# Patient Record
Sex: FEMALE | Race: WHITE | Employment: UNEMPLOYED | URBAN - METROPOLITAN AREA
[De-identification: names, ages, dates, MRNs, and addresses within clinical notes are randomized per-mention and may not be internally consistent; named-entity substitution may affect disease eponyms.]

---

## 2020-05-19 ENCOUNTER — HOSPITAL ENCOUNTER (EMERGENCY)
Age: 35
Discharge: HOME | End: 2020-05-19
Attending: EMERGENCY MEDICINE
Payer: COMMERCIAL

## 2020-05-19 VITALS
TEMPERATURE: 97.9 F | RESPIRATION RATE: 18 BRPM | WEIGHT: 269 LBS | BODY MASS INDEX: 45.93 KG/M2 | HEIGHT: 64 IN | DIASTOLIC BLOOD PRESSURE: 62 MMHG | SYSTOLIC BLOOD PRESSURE: 127 MMHG | OXYGEN SATURATION: 98 % | HEART RATE: 72 BPM

## 2020-05-19 LAB
BACTERIA: ABNORMAL /HPF
BILIRUBIN URINE: NEGATIVE
BLOOD, URINE: ABNORMAL
CLARITY: ABNORMAL
COLOR: YELLOW
EPITHELIAL CELLS, UA: ABNORMAL /HPF
GLUCOSE URINE: NEGATIVE MG/DL
HCG(URINE) PREGNANCY TEST: NEGATIVE
KETONES, URINE: NEGATIVE MG/DL
LEUKOCYTE ESTERASE, URINE: ABNORMAL
NITRITE, URINE: NEGATIVE
PH UA: 5.5 (ref 5–9)
PROTEIN UA: 30 MG/DL
RBC UA: ABNORMAL /HPF (ref 0–2)
SPECIFIC GRAVITY UA: >=1.03 (ref 1–1.03)
URINE REFLEX TO CULTURE: YES
UROBILINOGEN, URINE: 0.2 E.U./DL
WBC UA: ABNORMAL /HPF (ref 0–5)

## 2020-05-19 PROCEDURE — 87086 URINE CULTURE/COLONY COUNT: CPT

## 2020-05-19 PROCEDURE — 6370000000 HC RX 637 (ALT 250 FOR IP): Performed by: EMERGENCY MEDICINE

## 2020-05-19 PROCEDURE — 96372 THER/PROPH/DIAG INJ SC/IM: CPT

## 2020-05-19 PROCEDURE — 81001 URINALYSIS AUTO W/SCOPE: CPT

## 2020-05-19 PROCEDURE — 87077 CULTURE AEROBIC IDENTIFY: CPT

## 2020-05-19 PROCEDURE — 84703 CHORIONIC GONADOTROPIN ASSAY: CPT

## 2020-05-19 PROCEDURE — 6360000002 HC RX W HCPCS: Performed by: EMERGENCY MEDICINE

## 2020-05-19 PROCEDURE — 99284 EMERGENCY DEPT VISIT MOD MDM: CPT

## 2020-05-19 PROCEDURE — 87186 SC STD MICRODIL/AGAR DIL: CPT

## 2020-05-19 RX ORDER — NAPROXEN 500 MG/1
500 TABLET ORAL 2 TIMES DAILY WITH MEALS
Status: ON HOLD | COMMUNITY
Start: 2020-01-31 | End: 2021-02-05 | Stop reason: HOSPADM

## 2020-05-19 RX ORDER — ONDANSETRON 4 MG/1
4 TABLET, ORALLY DISINTEGRATING ORAL ONCE
Status: COMPLETED | OUTPATIENT
Start: 2020-05-19 | End: 2020-05-19

## 2020-05-19 RX ORDER — PROMETHAZINE HYDROCHLORIDE 25 MG/1
25 SUPPOSITORY RECTAL EVERY 6 HOURS PRN
Qty: 20 SUPPOSITORY | Refills: 0 | Status: SHIPPED | OUTPATIENT
Start: 2020-05-19 | End: 2020-05-26

## 2020-05-19 RX ORDER — PHENAZOPYRIDINE HYDROCHLORIDE 100 MG/1
200 TABLET, FILM COATED ORAL ONCE
Status: COMPLETED | OUTPATIENT
Start: 2020-05-19 | End: 2020-05-19

## 2020-05-19 RX ORDER — TRETINOIN 0.5 MG/G
CREAM TOPICAL
Status: ON HOLD | COMMUNITY
Start: 2017-05-18 | End: 2021-02-05 | Stop reason: HOSPADM

## 2020-05-19 RX ORDER — TOPIRAMATE 25 MG/1
25 TABLET ORAL NIGHTLY
Status: ON HOLD | COMMUNITY
Start: 2019-10-28 | End: 2021-02-05 | Stop reason: HOSPADM

## 2020-05-19 RX ORDER — AMMONIUM LACTATE 12 G/100G
CREAM TOPICAL
Status: ON HOLD | COMMUNITY
Start: 2017-05-18 | End: 2021-02-05 | Stop reason: HOSPADM

## 2020-05-19 RX ORDER — SIMVASTATIN 20 MG
20 TABLET ORAL NIGHTLY
Status: ON HOLD | COMMUNITY
Start: 2019-06-05 | End: 2021-02-05 | Stop reason: HOSPADM

## 2020-05-19 RX ORDER — CIPROFLOXACIN 500 MG/1
500 TABLET, FILM COATED ORAL 2 TIMES DAILY
Qty: 14 TABLET | Refills: 0 | Status: SHIPPED | OUTPATIENT
Start: 2020-05-19 | End: 2020-05-26

## 2020-05-19 RX ORDER — HYDROCODONE BITARTRATE AND ACETAMINOPHEN 5; 325 MG/1; MG/1
1 TABLET ORAL EVERY 6 HOURS PRN
Qty: 10 TABLET | Refills: 0 | Status: SHIPPED | OUTPATIENT
Start: 2020-05-19 | End: 2020-05-22

## 2020-05-19 RX ORDER — ONDANSETRON 4 MG/1
4 TABLET, FILM COATED ORAL EVERY 8 HOURS PRN
Qty: 20 TABLET | Refills: 0 | Status: ON HOLD | OUTPATIENT
Start: 2020-05-19 | End: 2021-02-05 | Stop reason: HOSPADM

## 2020-05-19 RX ORDER — METAXALONE 800 MG/1
400 TABLET ORAL 3 TIMES DAILY
Status: ON HOLD | COMMUNITY
Start: 2019-08-17 | End: 2021-02-05 | Stop reason: HOSPADM

## 2020-05-19 RX ORDER — CEFTRIAXONE 1 G/1
1 INJECTION, POWDER, FOR SOLUTION INTRAMUSCULAR; INTRAVENOUS ONCE
Status: COMPLETED | OUTPATIENT
Start: 2020-05-19 | End: 2020-05-19

## 2020-05-19 RX ORDER — SUMATRIPTAN 25 MG/1
25 TABLET, FILM COATED ORAL PRN
COMMUNITY
Start: 2019-06-03 | End: 2022-10-11

## 2020-05-19 RX ORDER — LEVOTHYROXINE SODIUM 0.2 MG/1
TABLET ORAL
COMMUNITY
Start: 2019-02-22

## 2020-05-19 RX ORDER — OXYCODONE HYDROCHLORIDE AND ACETAMINOPHEN 5; 325 MG/1; MG/1
1 TABLET ORAL ONCE
Status: COMPLETED | OUTPATIENT
Start: 2020-05-19 | End: 2020-05-19

## 2020-05-19 RX ORDER — PHENAZOPYRIDINE HYDROCHLORIDE 200 MG/1
200 TABLET, FILM COATED ORAL 3 TIMES DAILY PRN
Qty: 6 TABLET | Refills: 0 | Status: SHIPPED | OUTPATIENT
Start: 2020-05-19 | End: 2020-05-22

## 2020-05-19 RX ADMIN — ONDANSETRON 4 MG: 4 TABLET, ORALLY DISINTEGRATING ORAL at 20:46

## 2020-05-19 RX ADMIN — PHENAZOPYRIDINE HYDROCHLORIDE 200 MG: 100 TABLET ORAL at 20:46

## 2020-05-19 RX ADMIN — OXYCODONE HYDROCHLORIDE AND ACETAMINOPHEN 1 TABLET: 5; 325 TABLET ORAL at 20:46

## 2020-05-19 RX ADMIN — CEFTRIAXONE 1 G: 1 INJECTION, POWDER, FOR SOLUTION INTRAMUSCULAR; INTRAVENOUS at 20:46

## 2020-05-19 ASSESSMENT — PAIN SCALES - GENERAL
PAINLEVEL_OUTOF10: 9
PAINLEVEL_OUTOF10: 10
PAINLEVEL_OUTOF10: 10

## 2020-05-19 ASSESSMENT — PAIN DESCRIPTION - FREQUENCY: FREQUENCY: CONTINUOUS

## 2020-05-19 ASSESSMENT — PAIN DESCRIPTION - PROGRESSION: CLINICAL_PROGRESSION: GRADUALLY WORSENING

## 2020-05-19 ASSESSMENT — PAIN DESCRIPTION - ONSET: ONSET: ON-GOING

## 2020-05-19 ASSESSMENT — PAIN DESCRIPTION - DESCRIPTORS: DESCRIPTORS: PRESSURE;THROBBING

## 2020-05-19 ASSESSMENT — PAIN DESCRIPTION - LOCATION: LOCATION: GROIN;ABDOMEN;BACK

## 2020-05-19 ASSESSMENT — PAIN DESCRIPTION - PAIN TYPE: TYPE: ACUTE PAIN

## 2020-05-19 ASSESSMENT — PAIN DESCRIPTION - ORIENTATION: ORIENTATION: MID;LOWER

## 2020-05-22 LAB
ORGANISM: ABNORMAL
URINE CULTURE, ROUTINE: ABNORMAL

## 2020-06-26 ENCOUNTER — HOSPITAL ENCOUNTER (EMERGENCY)
Age: 35
Discharge: HOME | End: 2020-06-26
Attending: EMERGENCY MEDICINE
Payer: COMMERCIAL

## 2020-06-26 VITALS
OXYGEN SATURATION: 98 % | TEMPERATURE: 98.7 F | HEIGHT: 64 IN | SYSTOLIC BLOOD PRESSURE: 118 MMHG | HEART RATE: 64 BPM | WEIGHT: 260 LBS | BODY MASS INDEX: 44.39 KG/M2 | DIASTOLIC BLOOD PRESSURE: 84 MMHG | RESPIRATION RATE: 20 BRPM

## 2020-06-26 LAB
ANION GAP SERPL CALCULATED.3IONS-SCNC: 11 MEQ/L (ref 9–15)
BASOPHILS ABSOLUTE: 0 K/UL (ref 0–0.2)
BASOPHILS RELATIVE PERCENT: 0.1 %
BUN BLDV-MCNC: 8 MG/DL (ref 6–20)
CALCIUM SERPL-MCNC: 9.6 MG/DL (ref 8.5–9.9)
CHLORIDE BLD-SCNC: 102 MEQ/L (ref 95–107)
CO2: 27 MEQ/L (ref 20–31)
CREAT SERPL-MCNC: 0.74 MG/DL (ref 0.5–0.9)
D DIMER: <0.27 MG/L FEU (ref 0–0.5)
EOSINOPHILS ABSOLUTE: 0.2 K/UL (ref 0–0.7)
EOSINOPHILS RELATIVE PERCENT: 1.7 %
GFR AFRICAN AMERICAN: >60
GFR NON-AFRICAN AMERICAN: >60
GLUCOSE BLD-MCNC: 98 MG/DL (ref 70–99)
HCT VFR BLD CALC: 41.4 % (ref 37–47)
HEMOGLOBIN: 13.7 G/DL (ref 12–16)
LYMPHOCYTES ABSOLUTE: 2.2 K/UL (ref 1–4.8)
LYMPHOCYTES RELATIVE PERCENT: 21.5 %
MCH RBC QN AUTO: 30.5 PG (ref 27–31.3)
MCHC RBC AUTO-ENTMCNC: 33.1 % (ref 33–37)
MCV RBC AUTO: 92.1 FL (ref 82–100)
MONOCYTES ABSOLUTE: 0.5 K/UL (ref 0.2–0.8)
MONOCYTES RELATIVE PERCENT: 4.8 %
NEUTROPHILS ABSOLUTE: 7.2 K/UL (ref 1.4–6.5)
NEUTROPHILS RELATIVE PERCENT: 71.9 %
PDW BLD-RTO: 13.8 % (ref 11.5–14.5)
PLATELET # BLD: 348 K/UL (ref 130–400)
POTASSIUM SERPL-SCNC: 3.7 MEQ/L (ref 3.4–4.9)
RBC # BLD: 4.49 M/UL (ref 4.2–5.4)
SODIUM BLD-SCNC: 140 MEQ/L (ref 135–144)
WBC # BLD: 10.1 K/UL (ref 4.8–10.8)

## 2020-06-26 PROCEDURE — 36415 COLL VENOUS BLD VENIPUNCTURE: CPT

## 2020-06-26 PROCEDURE — 85025 COMPLETE CBC W/AUTO DIFF WBC: CPT

## 2020-06-26 PROCEDURE — 96372 THER/PROPH/DIAG INJ SC/IM: CPT

## 2020-06-26 PROCEDURE — 6370000000 HC RX 637 (ALT 250 FOR IP): Performed by: EMERGENCY MEDICINE

## 2020-06-26 PROCEDURE — 85379 FIBRIN DEGRADATION QUANT: CPT

## 2020-06-26 PROCEDURE — 80048 BASIC METABOLIC PNL TOTAL CA: CPT

## 2020-06-26 PROCEDURE — 6360000002 HC RX W HCPCS: Performed by: EMERGENCY MEDICINE

## 2020-06-26 PROCEDURE — 99283 EMERGENCY DEPT VISIT LOW MDM: CPT

## 2020-06-26 RX ORDER — KETOROLAC TROMETHAMINE 10 MG/1
10 TABLET, FILM COATED ORAL EVERY 6 HOURS PRN
Qty: 20 TABLET | Refills: 0 | Status: SHIPPED | OUTPATIENT
Start: 2020-06-26

## 2020-06-26 RX ORDER — KETOROLAC TROMETHAMINE 30 MG/ML
60 INJECTION, SOLUTION INTRAMUSCULAR; INTRAVENOUS ONCE
Status: COMPLETED | OUTPATIENT
Start: 2020-06-26 | End: 2020-06-26

## 2020-06-26 RX ORDER — SULFAMETHOXAZOLE AND TRIMETHOPRIM 800; 160 MG/1; MG/1
1 TABLET ORAL 2 TIMES DAILY
Qty: 20 TABLET | Refills: 0 | Status: SHIPPED | OUTPATIENT
Start: 2020-06-26 | End: 2020-07-06

## 2020-06-26 RX ORDER — SULFAMETHOXAZOLE AND TRIMETHOPRIM 800; 160 MG/1; MG/1
1 TABLET ORAL ONCE
Status: COMPLETED | OUTPATIENT
Start: 2020-06-26 | End: 2020-06-26

## 2020-06-26 RX ADMIN — KETOROLAC TROMETHAMINE 60 MG: 30 INJECTION, SOLUTION INTRAMUSCULAR at 19:19

## 2020-06-26 RX ADMIN — SULFAMETHOXAZOLE AND TRIMETHOPRIM 1 TABLET: 800; 160 TABLET ORAL at 20:03

## 2020-06-26 ASSESSMENT — ENCOUNTER SYMPTOMS
ABDOMINAL PAIN: 0
SHORTNESS OF BREATH: 0
NAUSEA: 0
PHOTOPHOBIA: 0
VOMITING: 0
APNEA: 0
DIARRHEA: 0
BACK PAIN: 0
COUGH: 0
EYE PAIN: 0
SORE THROAT: 0
RHINORRHEA: 0
CONSTIPATION: 0
ABDOMINAL DISTENTION: 0
WHEEZING: 0
SINUS PRESSURE: 0
COLOR CHANGE: 0

## 2020-06-26 ASSESSMENT — PAIN DESCRIPTION - DESCRIPTORS: DESCRIPTORS: THROBBING;BURNING

## 2020-06-26 ASSESSMENT — PAIN DESCRIPTION - ORIENTATION
ORIENTATION: RIGHT
ORIENTATION: RIGHT

## 2020-06-26 ASSESSMENT — PAIN SCALES - GENERAL
PAINLEVEL_OUTOF10: 7
PAINLEVEL_OUTOF10: 6
PAINLEVEL_OUTOF10: 8

## 2020-06-26 ASSESSMENT — PAIN DESCRIPTION - ONSET: ONSET: SUDDEN

## 2020-06-26 ASSESSMENT — PAIN DESCRIPTION - PAIN TYPE: TYPE: ACUTE PAIN

## 2020-06-26 ASSESSMENT — PAIN DESCRIPTION - LOCATION
LOCATION: FOOT
LOCATION: LEG

## 2020-06-26 ASSESSMENT — PAIN DESCRIPTION - FREQUENCY: FREQUENCY: CONTINUOUS

## 2020-06-26 NOTE — ED PROVIDER NOTES
[facility] ED  eMERGENCY dEPARTMENT eNCOUnter      Pt Name: Molly Hendrickson  MRN: [id number]  [place] 1985  Date of evaluation: 6/26/2020  Provider: Lola Luna MD    CHIEF COMPLAINT       Chief Complaint   Patient presents with    Other      Pt having redness and swelling to her left foot and ankle         HISTORY OF PRESENT ILLNESS   (Location/Symptom, Timing/Onset,Context/Setting, Quality, Duration, Modifying Factors, Severity)  Note limiting factors. Molly Hendrickson is a 38 y.o. female who presents to the emergency department with complaint of redness and swelling to the right ankle that began this morning. Denies fever or chills. Denies nausea or vomiting. Denies cough or expectoration. Denies shortness of breath or chest pain. Denies significant risk factors for thromboembolism. Pain was sudden and 8 in a scale of 1-10 and sharp. Pain does not radiate. Pain is worse with weightbearing. Rest helps. HPI    Nursing Notes were reviewed. REVIEW OF SYSTEMS    (2-9 systems for level 4, 10 or more for level 5)     Review of Systems   Constitutional: Negative. Negative for activity change, appetite change, chills, fatigue and fever. HENT: Negative for congestion, ear discharge, ear pain, hearing loss, rhinorrhea, sinus pressure and sore throat. Eyes: Negative for photophobia, pain and visual disturbance. Respiratory: Negative for apnea, cough, shortness of breath and wheezing. Cardiovascular: Negative for chest pain, palpitations and leg swelling. Gastrointestinal: Negative for abdominal distention, abdominal pain, constipation, diarrhea, nausea and vomiting. Endocrine: Negative for cold intolerance, heat intolerance and polyuria. Genitourinary: Negative for dysuria, flank pain, frequency and urgency. Musculoskeletal: Negative for arthralgias, back pain, gait problem, myalgias and neck stiffness. Skin: Negative. Negative for color change, pallor and rash. scleral icterus. Right eye: No discharge. Left eye: No discharge. Conjunctiva/sclera: Conjunctivae normal.      Pupils: Pupils are equal, round, and reactive to light. Neck:      Musculoskeletal: Normal range of motion and neck supple. No neck rigidity or muscular tenderness. Thyroid: No thyromegaly. Vascular: No carotid bruit or JVD. Trachea: No tracheal deviation. Cardiovascular:      Rate and Rhythm: Normal rate and regular rhythm. Pulses: Normal pulses. Heart sounds: Normal heart sounds. No murmur. No friction rub. No gallop. Pulmonary:      Effort: Pulmonary effort is normal. No respiratory distress. Breath sounds: Normal breath sounds. No stridor. No wheezing, rhonchi or rales. Chest:      Chest wall: No tenderness. Abdominal:      General: Bowel sounds are normal. There is no distension. Palpations: Abdomen is soft. There is no mass. Tenderness: There is no abdominal tenderness. There is no right CVA tenderness, left CVA tenderness, guarding or rebound. Hernia: No hernia is present. Musculoskeletal: Normal range of motion. General: Swelling present. No tenderness, deformity or signs of injury. Right lower leg: No edema. Left lower leg: No edema. Lymphadenopathy:      Cervical: No cervical adenopathy. Skin:     General: Skin is warm and dry. Capillary Refill: Capillary refill takes less than 2 seconds. Coloration: Skin is not jaundiced or pale. Findings: Erythema present. No bruising, lesion or rash. Comments: Ankle redness, swelling and increased warmth. Rodríguez sign positive. Neurological:      General: No focal deficit present. Mental Status: She is alert and oriented to person, place, and time. Cranial Nerves: No cranial nerve deficit. Sensory: No sensory deficit. Motor: No weakness or abnormal muscle tone.       Coordination: Coordination normal.      Gait: Gait

## 2021-01-25 ENCOUNTER — HOSPITAL ENCOUNTER (EMERGENCY)
Age: 36
Discharge: HOME | End: 2021-01-25
Attending: EMERGENCY MEDICINE
Payer: MEDICARE

## 2021-01-25 ENCOUNTER — APPOINTMENT (OUTPATIENT)
Dept: GENERAL RADIOLOGY | Age: 36
End: 2021-01-25
Payer: MEDICARE

## 2021-01-25 VITALS
SYSTOLIC BLOOD PRESSURE: 102 MMHG | RESPIRATION RATE: 16 BRPM | OXYGEN SATURATION: 99 % | DIASTOLIC BLOOD PRESSURE: 58 MMHG | TEMPERATURE: 99.5 F | HEART RATE: 81 BPM

## 2021-01-25 DIAGNOSIS — U07.1 2019 NOVEL CORONAVIRUS DISEASE (COVID-19): Primary | ICD-10-CM

## 2021-01-25 LAB — SARS-COV-2, NAAT: DETECTED

## 2021-01-25 PROCEDURE — 6360000002 HC RX W HCPCS: Performed by: EMERGENCY MEDICINE

## 2021-01-25 PROCEDURE — 71045 X-RAY EXAM CHEST 1 VIEW: CPT

## 2021-01-25 PROCEDURE — 99284 EMERGENCY DEPT VISIT MOD MDM: CPT

## 2021-01-25 PROCEDURE — U0002 COVID-19 LAB TEST NON-CDC: HCPCS

## 2021-01-25 PROCEDURE — 2580000003 HC RX 258: Performed by: EMERGENCY MEDICINE

## 2021-01-25 PROCEDURE — 96374 THER/PROPH/DIAG INJ IV PUSH: CPT

## 2021-01-25 RX ORDER — 0.9 % SODIUM CHLORIDE 0.9 %
1000 INTRAVENOUS SOLUTION INTRAVENOUS ONCE
Status: COMPLETED | OUTPATIENT
Start: 2021-01-25 | End: 2021-01-25

## 2021-01-25 RX ORDER — BUDESONIDE AND FORMOTEROL FUMARATE DIHYDRATE 160; 4.5 UG/1; UG/1
2 AEROSOL RESPIRATORY (INHALATION) 2 TIMES DAILY
Qty: 1 INHALER | Refills: 0 | Status: SHIPPED | OUTPATIENT
Start: 2021-01-25

## 2021-01-25 RX ORDER — KETOROLAC TROMETHAMINE 30 MG/ML
30 INJECTION, SOLUTION INTRAMUSCULAR; INTRAVENOUS ONCE
Status: COMPLETED | OUTPATIENT
Start: 2021-01-25 | End: 2021-01-25

## 2021-01-25 RX ORDER — IVERMECTIN 3 MG/1
12 TABLET ORAL ONCE
Qty: 8 TABLET | Refills: 1 | Status: SHIPPED | OUTPATIENT
Start: 2021-01-25 | End: 2021-01-25

## 2021-01-25 RX ADMIN — KETOROLAC TROMETHAMINE 30 MG: 30 INJECTION, SOLUTION INTRAMUSCULAR at 11:37

## 2021-01-25 RX ADMIN — SODIUM CHLORIDE 1000 ML: 9 INJECTION, SOLUTION INTRAVENOUS at 11:37

## 2021-01-25 ASSESSMENT — ENCOUNTER SYMPTOMS
WHEEZING: 0
RHINORRHEA: 0
ABDOMINAL PAIN: 0
ABDOMINAL DISTENTION: 0
PHOTOPHOBIA: 0
SHORTNESS OF BREATH: 1
FACIAL SWELLING: 0
COUGH: 1
EYE DISCHARGE: 0
COLOR CHANGE: 0
NAUSEA: 1
VOMITING: 0

## 2021-01-25 ASSESSMENT — PAIN SCALES - GENERAL: PAINLEVEL_OUTOF10: 9

## 2021-01-25 ASSESSMENT — PAIN DESCRIPTION - PAIN TYPE: TYPE: ACUTE PAIN

## 2021-01-25 NOTE — ED PROVIDER NOTES
[address] [facility] ED  eMERGENCY dEPARTMENT eNCOUnter      Pt Name: Yasir Rios  MRN: [id number]  [place] 1985  Date of evaluation: 1/25/2021  Provider: Karsten Hartley, [address] [place] [address]       Chief Complaint   Patient presents with    Other     pt states she was exposed to covid  last week and now  has fever chills  and cough          HISTORY OF PRESENT ILLNESS   (Location/Symptom, Timing/Onset,Context/Setting, Quality, Duration, Modifying Factors, Severity)  Note limiting factors. Yasir Rios is a 38 y.o. female who presents to the emergency department with a 2-day history of fevers and chills and dry cough and shortness of breath. Patient states that she was exposed to a known Covid positive person last week and started developing symptoms 5 days later. She has been quarantining in her bedroom at home. She is complaining of headaches and general body aches and some mild nausea as well. HPI    NursingNotes were reviewed. REVIEW OF SYSTEMS    (2-9 systems for level 4, 10 or more for level 5)     Review of Systems   Constitutional: Positive for activity change, appetite change, chills, fatigue and fever. HENT: Positive for congestion. Negative for facial swelling and rhinorrhea. Eyes: Negative for photophobia and discharge. Respiratory: Positive for cough and shortness of breath. Negative for wheezing. Cardiovascular: Negative for chest pain. Gastrointestinal: Positive for nausea. Negative for abdominal distention, abdominal pain and vomiting. Endocrine: Negative for polydipsia and polyphagia. Genitourinary: Negative for difficulty urinating, frequency, vaginal bleeding and vaginal discharge. Musculoskeletal: Negative for gait problem. Skin: Negative for color change. Allergic/Immunologic: Negative for immunocompromised state. Neurological: Positive for weakness and light-headedness. Negative for dizziness. Hematological: Negative for adenopathy. Psychiatric/Behavioral: Negative for behavioral problems. Except as noted above the remainder of the review of systems was reviewed and negative. PAST MEDICAL HISTORY     Past Medical History:   Diagnosis Date    Hyperlipidemia     Thyroid disease          SURGICALHISTORY     No past surgical history on file. CURRENT MEDICATIONS       Discharge Medication List as of 1/25/2021 12:44 PM      CONTINUE these medications which have NOT CHANGED    Details   ketorolac (TORADOL) 10 MG tablet Take 1 tablet by mouth every 6 hours as needed for Pain, Disp-20 tablet, R-0Print      ammonium lactate (AMLACTIN) 12 % cream Apply to upper arms twice daily, Historical Med      levothyroxine (SYNTHROID) 200 MCG tablet Take 1/2 tab on Monday. Take 1 full tab all other days of the week. Patient take 5 days a weekHistorical Med      metaxalone (SKELAXIN) 800 MG tablet Take 400 mg by mouth 3 times dailyHistorical Med      naproxen (NAPROSYN) 500 MG tablet Take 500 mg by mouth 2 times daily (with meals)Historical Med      simvastatin (ZOCOR) 20 MG tablet Take 20 mg by mouth nightlyHistorical Med      SUMAtriptan (IMITREX) 25 MG tablet Take 25 mg by mouth as neededHistorical Med      topiramate (TOPAMAX) 25 MG tablet Take 25 mg by mouth nightlyHistorical Med      tretinoin (RETIN-A) 0.05 % cream APPLY AT BEDTIME SPARINGLY, Historical Med      ondansetron (ZOFRAN) 4 MG tablet Take 1 tablet by mouth every 8 hours as needed for Nausea, Disp-20 tablet, R-0Print             ALLERGIES     Latex, Penicillins, Phenobarbital, and Adhesive tape    FAMILY HISTORY     No family history on file.        SOCIAL HISTORY       Social History     Socioeconomic History    Marital status: Single     Spouse name: Not on file    Number of children: Not on file    Years of education: Not on file    Highest education level: Not on file   Occupational History    Not on file   Social Needs    Financial resource strain: Not on file   Suzette-Tanner insecurity     Worry: Not on file     Inability: Not on file    Transportation needs     Medical: Not on file     Non-medical: Not on file   Tobacco Use    Smoking status: Not on file   Substance and Sexual Activity    Alcohol use: Not on file    Drug use: Not on file    Sexual activity: Not on file   Lifestyle    Physical activity     Days per week: Not on file     Minutes per session: Not on file    Stress: Not on file   Relationships    Social connections     Talks on phone: Not on file     Gets together: Not on file     Attends Jewish service: Not on file     Active member of club or organization: Not on file     Attends meetings of clubs or organizations: Not on file     Relationship status: Not on file    Intimate partner violence     Fear of current or ex partner: Not on file     Emotionally abused: Not on file     Physically abused: Not on file     Forced sexual activity: Not on file   Other Topics Concern    Not on file   Social History Narrative    Not on file       SCREENINGS      @AZJT([id number])@      PHYSICAL EXAM    (up to 7 for level 4, 8 or more for level 5)     ED Triage Vitals [01/25/21 1046]   BP Temp Temp Source Pulse Resp SpO2 Height Weight   130/72 99.5 °F (37.5 °C) Oral 81 16 99 % -- --       Physical Exam  Constitutional:       Appearance: She is well-developed. HENT:      Head: Normocephalic and atraumatic. Eyes:      Conjunctiva/sclera: Conjunctivae normal.      Pupils: Pupils are equal, round, and reactive to light. Neck:      Musculoskeletal: Normal range of motion and neck supple. Cardiovascular:      Rate and Rhythm: Normal rate. Pulmonary:      Effort: Pulmonary effort is normal.   Abdominal:      General: Bowel sounds are normal.      Palpations: Abdomen is soft. Musculoskeletal: Normal range of motion. Skin:     General: Skin is warm and dry. Neurological:      Mental Status: She is alert and oriented to person, place, and time.       Deep Tendon Reflexes: Reflexes are normal and symmetric. DIAGNOSTIC RESULTS     EKG: All EKG's are interpreted by the Emergency Department Physician who either signs or Co-signsthis chart in the absence of a cardiologist.        RADIOLOGY:   Creed Hawking such as CT, Ultrasound and MRI are read by the radiologist. Plain radiographic images are visualized and preliminarily interpreted by the emergency physician with the below findings:    No acute disease per my read    Interpretation per the Radiologist below, if available at the time ofthis note:    XR CHEST PORTABLE   Final Result   NO ACUTE CARDIOPULMONARY DISEASE. ED BEDSIDE ULTRASOUND:   Performed by ED Physician - none    LABS:  Labs Reviewed   COVID-19 - Abnormal; Notable for the following components:       Result Value    SARS-CoV-2, NAAT DETECTED (*)     All other components within normal limits    Narrative:     Lucas Kim tel. [phone],  COVID results called to and read back by Osiel Connolly, 01/25/2021 11:22, by  Ary Sweet       All other labs were within normal range or not returned as of this dictation. EMERGENCY DEPARTMENT COURSE and DIFFERENTIAL DIAGNOSIS/MDM:   Vitals:    Vitals:    01/25/21 1046 01/25/21 1238   BP: 130/72 (!) 102/58   Pulse: 81    Resp: 16    Temp: 99.5 °F (37.5 °C)    TempSrc: Oral    SpO2: 99%        Patient arrives here with marginally elevated body temperature and a dry cough. She is otherwise asymptomatic and just looks as though she does not feel well. She is complaining of a headache and just feeling achy all over and dry. She is given IV fluids and Toradol and feeling better on reevaluation. She will be discharged home in stable condition with instructions to return to the emergency department should her breathing become worse or should she develop further concerns. MDM    CRITICAL CARE TIME   Total Critical Care time was 0 minutes, excluding separately reportableprocedures.   There was a high

## 2021-01-25 NOTE — ED NOTES
Bed: 17  Expected date: 1/25/21  Expected time:   Means of arrival:   Comments:  35F CP w/breathing and weakness  Exposed to covid  130/80, 90HR, [address], RN  01/25/21 1042

## 2021-01-26 ENCOUNTER — CARE COORDINATION (OUTPATIENT)
Dept: CARE COORDINATION | Age: 36
End: 2021-01-26

## 2021-01-26 NOTE — CARE COORDINATION
Patient contacted regarding [id number] diagnosis\". Discussed COVID-19 related testing which was available at this time. Test results were positive. Patient informed of results, if available? Yes    Care Transition Nurse/ Ambulatory Care Manager contacted the patient by telephone to perform post discharge assessment. Call within 2 business days of discharge: Yes. Verified name and  with patient as identifiers. Provided introduction to self, and explanation of the CTN/ACM role, and reason for call due to risk factors for infection and/or exposure to COVID-19. Symptoms reviewed with patient who verbalized the following symptoms: fatigue, pain or aching joints, cough, shortness of breath, loss of taste or smell, diarrhea, dizziness/lightheadedness, no new symptoms and no worsening symptoms. Due to no new or worsening symptoms encounter was not routed to provider for escalation. Discussed follow-up appointments. If no appointment was previously scheduled, appointment scheduling offered: Yes and follow up is [facility] follow up appointment(s): No future appointments. Non-[facility] follow up appointment(s): n/a    Non-face-to-face services provided:  Obtained and reviewed discharge summary and/or continuity of care documents     Advance Care Planning:   Does patient have an Advance Directive:  reviewed and current. Patient has following risk factors of: no known risk factors. CTN/ACM reviewed discharge instructions, medical action plan and red flags such as increased shortness of breath, increasing fever and signs of decompensation with patient who verbalized understanding. Discussed exposure protocols and quarantine with CDC Guidelines What to do if you are sick with coronavirus disease 2019.  Patient was given an opportunity for questions and concerns.  The patient agrees to contact the [facility] exposure line [phone], local [facility] department [place] [facility]: ([phone]) and PCP office for questions related to their healthcare. CTN/ACM provided contact information for future needs. Reviewed and educated patient on any new and changed medications related to discharge diagnosis     Patient/family/caregiver given information for GetWell Loop and agrees to enroll yes  Patient's preferred e-mail: n/a   Patient's preferred phone number: on file  Based on Loop alert triggers, patient will be contacted by nurse care manager for worsening symptoms. Pt will be further monitored by COVID Loop Team based on severity of symptoms and risk factors.

## 2021-02-01 ENCOUNTER — APPOINTMENT (OUTPATIENT)
Dept: GENERAL RADIOLOGY | Age: 36
End: 2021-02-01
Payer: MEDICARE

## 2021-02-01 ENCOUNTER — APPOINTMENT (OUTPATIENT)
Dept: CT IMAGING | Age: 36
End: 2021-02-01
Payer: MEDICARE

## 2021-02-01 ENCOUNTER — HOSPITAL ENCOUNTER (EMERGENCY)
Age: 36
Discharge: ANOTHER HOSPITAL | End: 2021-02-01
Attending: EMERGENCY MEDICINE
Payer: MEDICARE

## 2021-02-01 ENCOUNTER — HOSPITAL ENCOUNTER (INPATIENT)
Age: 36
LOS: 4 days | Discharge: HOME | DRG: 137 | End: 2021-02-05
Attending: INTERNAL MEDICINE | Admitting: INTERNAL MEDICINE
Payer: MEDICARE

## 2021-02-01 VITALS
RESPIRATION RATE: 17 BRPM | TEMPERATURE: 98.6 F | HEART RATE: 67 BPM | DIASTOLIC BLOOD PRESSURE: 79 MMHG | OXYGEN SATURATION: 94 % | SYSTOLIC BLOOD PRESSURE: 132 MMHG

## 2021-02-01 DIAGNOSIS — R09.02 HYPOXIA: Primary | ICD-10-CM

## 2021-02-01 DIAGNOSIS — U07.1 COVID-19: ICD-10-CM

## 2021-02-01 LAB
ALBUMIN SERPL-MCNC: 3.5 G/DL (ref 3.5–4.6)
ALP BLD-CCNC: 45 U/L (ref 40–130)
ALT SERPL-CCNC: 9 U/L (ref 0–33)
ANION GAP SERPL CALCULATED.3IONS-SCNC: 10 MEQ/L (ref 9–15)
ANION GAP SERPL CALCULATED.3IONS-SCNC: 11 MEQ/L (ref 9–15)
AST SERPL-CCNC: 22 U/L (ref 0–35)
ATYPICAL LYMPHOCYTE RELATIVE PERCENT: 3 %
BASOPHILS ABSOLUTE: 0 K/UL (ref 0–0.2)
BASOPHILS ABSOLUTE: 0 K/UL (ref 0–0.2)
BASOPHILS RELATIVE PERCENT: 0.1 %
BASOPHILS RELATIVE PERCENT: 0.5 %
BILIRUB SERPL-MCNC: 0.5 MG/DL (ref 0.2–0.7)
BUN BLDV-MCNC: 10 MG/DL (ref 6–20)
BUN BLDV-MCNC: 9 MG/DL (ref 6–20)
CALCIUM SERPL-MCNC: 8.2 MG/DL (ref 8.5–9.9)
CALCIUM SERPL-MCNC: 8.2 MG/DL (ref 8.5–9.9)
CHLORIDE BLD-SCNC: 97 MEQ/L (ref 95–107)
CHLORIDE BLD-SCNC: 98 MEQ/L (ref 95–107)
CO2: 25 MEQ/L (ref 20–31)
CO2: 26 MEQ/L (ref 20–31)
CREAT SERPL-MCNC: 0.7 MG/DL (ref 0.5–0.9)
CREAT SERPL-MCNC: 0.75 MG/DL (ref 0.5–0.9)
D DIMER: 1.78 MG/L FEU (ref 0–0.5)
EKG ATRIAL RATE: 61 BPM
EKG P AXIS: 36 DEGREES
EKG P-R INTERVAL: 162 MS
EKG Q-T INTERVAL: 414 MS
EKG QRS DURATION: 106 MS
EKG QTC CALCULATION (BAZETT): 416 MS
EKG R AXIS: 2 DEGREES
EKG T AXIS: -4 DEGREES
EKG VENTRICULAR RATE: 61 BPM
EOSINOPHILS ABSOLUTE: 0 K/UL (ref 0–0.7)
EOSINOPHILS ABSOLUTE: 0 K/UL (ref 0–0.7)
EOSINOPHILS RELATIVE PERCENT: 0 %
EOSINOPHILS RELATIVE PERCENT: 0 %
GFR AFRICAN AMERICAN: >60
GFR AFRICAN AMERICAN: >60
GFR NON-AFRICAN AMERICAN: >60
GFR NON-AFRICAN AMERICAN: >60
GLOBULIN: 3.3 G/DL (ref 2.3–3.5)
GLUCOSE BLD-MCNC: 111 MG/DL (ref 70–99)
GLUCOSE BLD-MCNC: 134 MG/DL (ref 70–99)
HCG QUALITATIVE: NEGATIVE
HCT VFR BLD CALC: 33.9 % (ref 37–47)
HCT VFR BLD CALC: 38.5 % (ref 37–47)
HEMOGLOBIN: 11.4 G/DL (ref 12–16)
HEMOGLOBIN: 12.6 G/DL (ref 12–16)
LYMPHOCYTES ABSOLUTE: 0.7 K/UL (ref 1–4.8)
LYMPHOCYTES ABSOLUTE: 0.7 K/UL (ref 1–4.8)
LYMPHOCYTES RELATIVE PERCENT: 18.8 %
LYMPHOCYTES RELATIVE PERCENT: 22 %
MCH RBC QN AUTO: 30.4 PG (ref 27–31.3)
MCH RBC QN AUTO: 30.4 PG (ref 27–31.3)
MCHC RBC AUTO-ENTMCNC: 32.8 % (ref 33–37)
MCHC RBC AUTO-ENTMCNC: 33.5 % (ref 33–37)
MCV RBC AUTO: 90.7 FL (ref 82–100)
MCV RBC AUTO: 92.6 FL (ref 82–100)
MONOCYTES ABSOLUTE: 0 K/UL (ref 0.2–0.8)
MONOCYTES ABSOLUTE: 0.2 K/UL (ref 0.2–0.8)
MONOCYTES RELATIVE PERCENT: 0.8 %
MONOCYTES RELATIVE PERCENT: 6.2 %
NEUTROPHILS ABSOLUTE: 1.9 K/UL (ref 1.4–6.5)
NEUTROPHILS ABSOLUTE: 2.9 K/UL (ref 1.4–6.5)
NEUTROPHILS RELATIVE PERCENT: 74 %
NEUTROPHILS RELATIVE PERCENT: 74.5 %
PDW BLD-RTO: 13.6 % (ref 11.5–14.5)
PDW BLD-RTO: 13.6 % (ref 11.5–14.5)
PLATELET # BLD: 199 K/UL (ref 130–400)
PLATELET # BLD: 213 K/UL (ref 130–400)
PLATELET SLIDE REVIEW: NORMAL
POTASSIUM REFLEX MAGNESIUM: 3.8 MEQ/L (ref 3.4–4.9)
POTASSIUM SERPL-SCNC: 3.5 MEQ/L (ref 3.4–4.9)
RBC # BLD: 3.74 M/UL (ref 4.2–5.4)
RBC # BLD: 4.16 M/UL (ref 4.2–5.4)
RBC # BLD: NORMAL 10*6/UL
SODIUM BLD-SCNC: 132 MEQ/L (ref 135–144)
SODIUM BLD-SCNC: 135 MEQ/L (ref 135–144)
TOTAL PROTEIN: 6.8 G/DL (ref 6.3–8)
WBC # BLD: 2.6 K/UL (ref 4.8–10.8)
WBC # BLD: 3.9 K/UL (ref 4.8–10.8)

## 2021-02-01 PROCEDURE — 85379 FIBRIN DEGRADATION QUANT: CPT

## 2021-02-01 PROCEDURE — 94761 N-INVAS EAR/PLS OXIMETRY MLT: CPT

## 2021-02-01 PROCEDURE — 2500000003 HC RX 250 WO HCPCS: Performed by: INTERNAL MEDICINE

## 2021-02-01 PROCEDURE — 85025 COMPLETE CBC W/AUTO DIFF WBC: CPT

## 2021-02-01 PROCEDURE — 93005 ELECTROCARDIOGRAM TRACING: CPT

## 2021-02-01 PROCEDURE — 36415 COLL VENOUS BLD VENIPUNCTURE: CPT

## 2021-02-01 PROCEDURE — 2580000003 HC RX 258: Performed by: EMERGENCY MEDICINE

## 2021-02-01 PROCEDURE — 93005 ELECTROCARDIOGRAM TRACING: CPT | Performed by: INTERNAL MEDICINE

## 2021-02-01 PROCEDURE — 94664 DEMO&/EVAL PT USE INHALER: CPT

## 2021-02-01 PROCEDURE — 71045 X-RAY EXAM CHEST 1 VIEW: CPT

## 2021-02-01 PROCEDURE — 6360000004 HC RX CONTRAST MEDICATION: Performed by: EMERGENCY MEDICINE

## 2021-02-01 PROCEDURE — 6360000002 HC RX W HCPCS: Performed by: INTERNAL MEDICINE

## 2021-02-01 PROCEDURE — 99284 EMERGENCY DEPT VISIT MOD MDM: CPT

## 2021-02-01 PROCEDURE — 80053 COMPREHEN METABOLIC PANEL: CPT

## 2021-02-01 PROCEDURE — 1210000000 HC MED SURG R&B

## 2021-02-01 PROCEDURE — 6370000000 HC RX 637 (ALT 250 FOR IP): Performed by: INTERNAL MEDICINE

## 2021-02-01 PROCEDURE — 80048 BASIC METABOLIC PNL TOTAL CA: CPT

## 2021-02-01 PROCEDURE — 93010 ELECTROCARDIOGRAM REPORT: CPT | Performed by: INTERNAL MEDICINE

## 2021-02-01 PROCEDURE — 96375 TX/PRO/DX INJ NEW DRUG ADDON: CPT

## 2021-02-01 PROCEDURE — 6360000002 HC RX W HCPCS: Performed by: EMERGENCY MEDICINE

## 2021-02-01 PROCEDURE — 2060000000 HC ICU INTERMEDIATE R&B

## 2021-02-01 PROCEDURE — 96374 THER/PROPH/DIAG INJ IV PUSH: CPT

## 2021-02-01 PROCEDURE — 2700000000 HC OXYGEN THERAPY PER DAY

## 2021-02-01 PROCEDURE — 71275 CT ANGIOGRAPHY CHEST: CPT

## 2021-02-01 PROCEDURE — 2580000003 HC RX 258: Performed by: INTERNAL MEDICINE

## 2021-02-01 PROCEDURE — 96361 HYDRATE IV INFUSION ADD-ON: CPT

## 2021-02-01 PROCEDURE — 84703 CHORIONIC GONADOTROPIN ASSAY: CPT

## 2021-02-01 RX ORDER — SODIUM CHLORIDE 0.9 % (FLUSH) 0.9 %
10 SYRINGE (ML) INJECTION PRN
Status: DISCONTINUED | OUTPATIENT
Start: 2021-02-01 | End: 2021-02-05 | Stop reason: HOSPADM

## 2021-02-01 RX ORDER — ACETAMINOPHEN 650 MG/1
650 SUPPOSITORY RECTAL EVERY 6 HOURS PRN
Status: DISCONTINUED | OUTPATIENT
Start: 2021-02-01 | End: 2021-02-05 | Stop reason: HOSPADM

## 2021-02-01 RX ORDER — ATORVASTATIN CALCIUM 20 MG/1
20 TABLET, FILM COATED ORAL NIGHTLY
Status: DISCONTINUED | OUTPATIENT
Start: 2021-02-01 | End: 2021-02-05 | Stop reason: HOSPADM

## 2021-02-01 RX ORDER — LEVOTHYROXINE SODIUM 0.1 MG/1
200 TABLET ORAL DAILY
Status: DISCONTINUED | OUTPATIENT
Start: 2021-02-02 | End: 2021-02-05 | Stop reason: HOSPADM

## 2021-02-01 RX ORDER — ACETAMINOPHEN 325 MG/1
650 TABLET ORAL EVERY 6 HOURS PRN
Status: DISCONTINUED | OUTPATIENT
Start: 2021-02-01 | End: 2021-02-05 | Stop reason: HOSPADM

## 2021-02-01 RX ORDER — ONDANSETRON 2 MG/ML
4 INJECTION INTRAMUSCULAR; INTRAVENOUS ONCE
Status: COMPLETED | OUTPATIENT
Start: 2021-02-01 | End: 2021-02-01

## 2021-02-01 RX ORDER — 0.9 % SODIUM CHLORIDE 0.9 %
1000 INTRAVENOUS SOLUTION INTRAVENOUS ONCE
Status: COMPLETED | OUTPATIENT
Start: 2021-02-01 | End: 2021-02-01

## 2021-02-01 RX ORDER — PROMETHAZINE HYDROCHLORIDE 12.5 MG/1
12.5 TABLET ORAL EVERY 6 HOURS PRN
Status: DISCONTINUED | OUTPATIENT
Start: 2021-02-01 | End: 2021-02-05 | Stop reason: HOSPADM

## 2021-02-01 RX ORDER — GUAIFENESIN 600 MG/1
600 TABLET, EXTENDED RELEASE ORAL 2 TIMES DAILY
Status: DISCONTINUED | OUTPATIENT
Start: 2021-02-01 | End: 2021-02-05 | Stop reason: HOSPADM

## 2021-02-01 RX ORDER — ONDANSETRON 2 MG/ML
4 INJECTION INTRAMUSCULAR; INTRAVENOUS EVERY 6 HOURS PRN
Status: DISCONTINUED | OUTPATIENT
Start: 2021-02-01 | End: 2021-02-05 | Stop reason: HOSPADM

## 2021-02-01 RX ORDER — POLYETHYLENE GLYCOL 3350 17 G/17G
17 POWDER, FOR SOLUTION ORAL DAILY PRN
Status: DISCONTINUED | OUTPATIENT
Start: 2021-02-01 | End: 2021-02-05 | Stop reason: HOSPADM

## 2021-02-01 RX ORDER — IBUPROFEN 400 MG/1
400 TABLET ORAL EVERY 6 HOURS PRN
Status: DISCONTINUED | OUTPATIENT
Start: 2021-02-01 | End: 2021-02-05 | Stop reason: HOSPADM

## 2021-02-01 RX ORDER — SODIUM CHLORIDE 0.9 % (FLUSH) 0.9 %
10 SYRINGE (ML) INJECTION EVERY 12 HOURS SCHEDULED
Status: DISCONTINUED | OUTPATIENT
Start: 2021-02-01 | End: 2021-02-05 | Stop reason: HOSPADM

## 2021-02-01 RX ORDER — KETOROLAC TROMETHAMINE 30 MG/ML
30 INJECTION, SOLUTION INTRAMUSCULAR; INTRAVENOUS ONCE
Status: COMPLETED | OUTPATIENT
Start: 2021-02-01 | End: 2021-02-01

## 2021-02-01 RX ORDER — METHYLPREDNISOLONE SODIUM SUCCINATE 125 MG/2ML
125 INJECTION, POWDER, LYOPHILIZED, FOR SOLUTION INTRAMUSCULAR; INTRAVENOUS EVERY 6 HOURS
Status: DISCONTINUED | OUTPATIENT
Start: 2021-02-01 | End: 2021-02-01 | Stop reason: HOSPADM

## 2021-02-01 RX ADMIN — Medication 10 ML: at 19:59

## 2021-02-01 RX ADMIN — SODIUM CHLORIDE 1000 ML: 9 INJECTION, SOLUTION INTRAVENOUS at 03:31

## 2021-02-01 RX ADMIN — ENOXAPARIN SODIUM 30 MG: 30 INJECTION SUBCUTANEOUS at 10:59

## 2021-02-01 RX ADMIN — IOPAMIDOL 100 ML: 755 INJECTION, SOLUTION INTRAVENOUS at 04:39

## 2021-02-01 RX ADMIN — ATORVASTATIN CALCIUM 20 MG: 20 TABLET, FILM COATED ORAL at 22:32

## 2021-02-01 RX ADMIN — DEXAMETHASONE 6 MG: 2 TABLET ORAL at 10:59

## 2021-02-01 RX ADMIN — REMDESIVIR 200 MG: 100 INJECTION, POWDER, LYOPHILIZED, FOR SOLUTION INTRAVENOUS at 16:20

## 2021-02-01 RX ADMIN — ENOXAPARIN SODIUM 30 MG: 30 INJECTION SUBCUTANEOUS at 19:58

## 2021-02-01 RX ADMIN — GUAIFENESIN 600 MG: 600 TABLET ORAL at 19:59

## 2021-02-01 RX ADMIN — KETOROLAC TROMETHAMINE 30 MG: 30 INJECTION, SOLUTION INTRAMUSCULAR at 03:31

## 2021-02-01 RX ADMIN — METHYLPREDNISOLONE SODIUM SUCCINATE 125 MG: 125 INJECTION, POWDER, FOR SOLUTION INTRAMUSCULAR; INTRAVENOUS at 03:31

## 2021-02-01 RX ADMIN — ONDANSETRON 4 MG: 2 INJECTION INTRAMUSCULAR; INTRAVENOUS at 03:31

## 2021-02-01 RX ADMIN — GUAIFENESIN 600 MG: 600 TABLET ORAL at 10:59

## 2021-02-01 RX ADMIN — Medication 10 ML: at 10:59

## 2021-02-01 ASSESSMENT — ENCOUNTER SYMPTOMS
ABDOMINAL PAIN: 0
EYE DISCHARGE: 0
RHINORRHEA: 1
NAUSEA: 0
SHORTNESS OF BREATH: 1
EYE REDNESS: 0
SORE THROAT: 0
COUGH: 1
BACK PAIN: 0
VOMITING: 0

## 2021-02-01 ASSESSMENT — PAIN DESCRIPTION - ORIENTATION: ORIENTATION: ANTERIOR

## 2021-02-01 ASSESSMENT — PAIN DESCRIPTION - FREQUENCY: FREQUENCY: INTERMITTENT

## 2021-02-01 ASSESSMENT — PAIN SCALES - GENERAL: PAINLEVEL_OUTOF10: 5

## 2021-02-01 ASSESSMENT — PAIN DESCRIPTION - PAIN TYPE
TYPE: ACUTE PAIN
TYPE: ACUTE PAIN

## 2021-02-01 ASSESSMENT — PAIN DESCRIPTION - LOCATION: LOCATION: CHEST

## 2021-02-01 NOTE — FLOWSHEET NOTE
Called mother, Justin Lisandro for update.  Electronically signed by Karina Staples RN on 2/1/2021 at 11:11 AM

## 2021-02-01 NOTE — ACP (ADVANCE CARE PLANNING)
Patient completed her Advance Directives (HCPOA and LW) and a copy has been placed in her chart. The patient named her mother, Gavino Hudson, as her primary agent.

## 2021-02-01 NOTE — ED PROVIDER NOTES
[facility] ED  EMERGENCY DEPARTMENT ENCOUNTER      Pt Name: Iker Jean Baptiste  MRN: [id number]  [place] 1985  Date of evaluation: 2/1/2021  Provider: Norris Reda DO        HISTORY OF PRESENT ILLNESS    Eva Damon is a 38 y.o. female per chart review has ah/o hyperlipidemia, thyroid disease. She was recently diagnosed with COVID-19. Today she is feeling SOB. She can't keep anything down. The history is provided by the patient. Shortness of Breath  Severity:  Moderate  Onset quality:  Unable to specify  Timing:  Constant  Progression:  Worsening  Chronicity:  New  Context: URI    Relieved by:  Nothing  Worsened by:  Nothing  Ineffective treatments:  None tried  Associated symptoms: cough and fever    Associated symptoms: no abdominal pain, no chest pain, no ear pain, no neck pain, no rash, no sore throat and no vomiting             REVIEW OF SYSTEMS       Review of Systems   Constitutional: Positive for appetite change, fatigue and fever. Negative for chills. HENT: Positive for congestion, postnasal drip and rhinorrhea. Negative for ear pain and sore throat. Eyes: Negative for discharge and redness. Respiratory: Positive for cough and shortness of breath. Cardiovascular: Negative for chest pain and palpitations. Gastrointestinal: Negative for abdominal pain, nausea and vomiting. Genitourinary: Negative for difficulty urinating and dysuria. Musculoskeletal: Positive for myalgias. Negative for back pain and neck pain. Skin: Negative for rash and wound. Neurological: Negative for dizziness and syncope. Psychiatric/Behavioral: Negative for confusion. The patient is not nervous/anxious. All other systems reviewed and are negative. Except as noted above the remainder of the review of systems was reviewed and negative.        PAST MEDICAL HISTORY     Past Medical History:   Diagnosis Date    Hyperlipidemia     Thyroid disease          SURGICAL HISTORY     No past surgical history on file. CURRENT MEDICATIONS       Previous Medications    AMMONIUM LACTATE (AMLACTIN) 12 % CREAM    Apply to upper arms twice daily    BUDESONIDE-FORMOTEROL (SYMBICORT) 160-4.5 MCG/ACT AERO    Inhale 2 puffs into the lungs 2 times daily    KETOROLAC (TORADOL) 10 MG TABLET    Take 1 tablet by mouth every 6 hours as needed for Pain    LEVOTHYROXINE (SYNTHROID) 200 MCG TABLET    Take 1/2 tab on Monday. Take 1 full tab all other days of the week. Patient take 5 days a week    METAXALONE (SKELAXIN) 800 MG TABLET    Take 400 mg by mouth 3 times daily    NAPROXEN (NAPROSYN) 500 MG TABLET    Take 500 mg by mouth 2 times daily (with meals)    ONDANSETRON (ZOFRAN) 4 MG TABLET    Take 1 tablet by mouth every 8 hours as needed for Nausea    SIMVASTATIN (ZOCOR) 20 MG TABLET    Take 20 mg by mouth nightly    SUMATRIPTAN (IMITREX) 25 MG TABLET    Take 25 mg by mouth as needed    TOPIRAMATE (TOPAMAX) 25 MG TABLET    Take 25 mg by mouth nightly    TRETINOIN (RETIN-A) 0.05 % CREAM    APPLY AT BEDTIME SPARINGLY       ALLERGIES     Latex, Penicillins, Phenobarbital, and Adhesive tape    FAMILY HISTORY     No family history on file.        SOCIAL HISTORY       Social History     Socioeconomic History    Marital status: Single     Spouse name: Not on file    Number of children: Not on file    Years of education: Not on file    Highest education level: Not on file   Occupational History    Not on file   Social Needs    Financial resource strain: Not on file    Food insecurity     Worry: Not on file     Inability: Not on file    Transportation needs     Medical: Not on file     Non-medical: Not on file   Tobacco Use    Smoking status: Not on file   Substance and Sexual Activity    Alcohol use: Not on file    Drug use: Not on file    Sexual activity: Not on file   Lifestyle    Physical activity     Days per week: Not on file     Minutes per session: Not on file    Stress: Not on file   Relationships    Social connections     Talks on phone: Not on file     Gets together: Not on file     Attends Spiritism service: Not on file     Active member of club or organization: Not on file     Attends meetings of clubs or organizations: Not on file     Relationship status: Not on file    Intimate partner violence     Fear of current or ex partner: Not on file     Emotionally abused: Not on file     Physically abused: Not on file     Forced sexual activity: Not on file   Other Topics Concern    Not on file   Social History Narrative    Not on file         PHYSICAL EXAM       ED Triage Vitals [02/01/21 0255]   BP Temp Temp Source Pulse Resp SpO2 Height Weight   132/79 98.6 °F (37 °C) Oral 83 20 (!) 88 % -- --       Physical Exam  Vitals signs and nursing note reviewed. Constitutional:       Appearance: Normal appearance. She is obese. HENT:      Head: Normocephalic and atraumatic. Right Ear: Tympanic membrane normal.      Left Ear: Tympanic membrane normal.      Nose: Nose normal.      Mouth/Throat:      Mouth: Mucous membranes are moist.      Pharynx: Oropharynx is clear. Eyes:      General: Lids are normal.      Extraocular Movements: Extraocular movements intact. Conjunctiva/sclera: Conjunctivae normal.      Pupils: Pupils are equal, round, and reactive to light. Neck:      Musculoskeletal: Full passive range of motion without pain, normal range of motion and neck supple. Cardiovascular:      Rate and Rhythm: Normal rate and regular rhythm. Pulses: Normal pulses. Heart sounds: Normal heart sounds. Pulmonary:      Effort: Pulmonary effort is normal.      Breath sounds: Normal breath sounds. Abdominal:      General: Abdomen is flat. Bowel sounds are normal.      Palpations: Abdomen is soft. Musculoskeletal: Normal range of motion. Skin:     General: Skin is warm. Capillary Refill: Capillary refill takes less than 2 seconds. Neurological:      General: No focal deficit present. Mental Status: She is alert and oriented to person, place, and time. Deep Tendon Reflexes: Reflexes are normal and symmetric. Psychiatric:         Attention and Perception: Attention and perception normal.         Mood and Affect: Mood normal.         Behavior: Behavior normal. Behavior is cooperative. LABS:  Labs Reviewed   BASIC METABOLIC PANEL - Abnormal; Notable for the following components:       Result Value    Glucose 111 (*)     Calcium 8.2 (*)     All other components within normal limits   CBC WITH AUTO DIFFERENTIAL - Abnormal; Notable for the following components:    WBC 3.9 (*)     RBC 3.74 (*)     Hemoglobin 11.4 (*)     Hematocrit 33.9 (*)     Lymphocytes Absolute 0.7 (*)     All other components within normal limits   D-DIMER, QUANTITATIVE - Abnormal; Notable for the following components:    D-Dimer, Quant 1.78 (*)     All other components within normal limits    Narrative:     CALL  Nichole  LOER tel. [phone],  Coag results called to and read back by [facility] RN, 02/01/2021 03:54, by LILLY   HCG, SERUM, QUALITATIVE   URINE RT REFLEX TO CULTURE   PREGNANCY, URINE         MDM:   Vitals:    Vitals:    02/01/21 0255 02/01/21 0257 02/01/21 0341 02/01/21 0528   BP: 132/79      Pulse: 83  75 67   Resp: 20   17   Temp: 98.6 °F (37 °C)      TempSrc: Oral      SpO2: (!) 88% 94% 95% 94%       MDM  Number of Diagnoses or Management Options  COVID-19  Hypoxia  Diagnosis management comments: Patient presents with SOB and COVID-19 diagnosis. Labs, CXR, IVF, medication ordered. Patient has a positive d dimer. CT chest ordered. CT chest is negative for PE, small bilateral pleural effusions. Her pulse ox is 88% RA. She wishes to be admitted to [facility].  Transfer center called. Her labs were reviewed and do not show any acute changes. Her CXR show bilateral infiltrates.   Patient accepted by Dr. Bay Ospina       EKG Interpretation    Interpreted by emergency department physician    Rhythm: normal sinus   Rate: normal  Axis: normal  Ectopy: none  Conduction: normal  ST Segments: no acute change  T Waves: no acute change  Q Waves: none    Clinical Impression: non-specific EKG    DRAKE BHAT     The lab results, radiology and test results were reviewed with the patient and family. The patient will follow up in 2 days with their primary care doctor. If their symptoms change or get worse they will return to the ER. CRITICAL CARE TIME   Total CriticalCare time was 0 minutes, excluding separately reportable procedures. There was a high probability of clinically significant/life threatening deterioration in the patient's condition which required my urgent intervention. PROCEDURES:  Unlessotherwise noted below, none     Procedures      FINAL IMPRESSION      1. Hypoxia    2.  COVID-19          DISPOSITION/PLAN   DISPOSITION Decision To Transfer 02/01/2021 05:22:38 AM          Yakov Man DO (electronically signed)  Attending Emergency Physician          Ray Herrera DO  02/03/21 [id number]

## 2021-02-01 NOTE — H&P
Hospital Medicine  History and Physical    Patient:  Devin Jewell  MRN: [id number]    CHIEF COMPLAINT:  COVID PNA    History Obtained From:  patient, electronic medical record  Primary Care Physician: ARLYN Salguero CNP    HISTORY OF PRESENT ILLNESS:   The patient is a 38 y.o. female who presents with Dyspnea, fevers, chills, myalgias, nausea, vomiting, poor appetite. Patient was diagnosed with COVID-19 several days previously in the emergency department 1/25. She lives at home along with her  they both work from home and have not had any community exposures as far as they are aware. No recent travel. Upon previoius emergency department evaluation she is started on ivermectin and Symbicort and discharged home however she presented back again to the hospital today with worsening symptoms. On arrival she is found to be hypoxic and admitted to the COVID-19 floor and started on Decadron. CTA chest was done in [facility] with negative for PE but did show bilateral groundglass opacifications suggestive of COVID-19. Patient does not smoke she does not drink she does not use drugs. Home medications include Lipitor and Synthroid    Past Medical History:      Diagnosis Date    Hyperlipidemia     Thyroid disease        Past Surgical History:  No past surgical history on file. Medications Prior to Admission:    Prior to Admission medications    Medication Sig Start Date End Date Taking? Authorizing Provider   budesonide-formoterol (SYMBICORT) 160-4.5 MCG/ACT AERO Inhale 2 puffs into the lungs 2 times daily 1/25/21  Yes Lisa Olivas DO   ketorolac (TORADOL) 10 MG tablet Take 1 tablet by mouth every 6 hours as needed for Pain 6/26/20  Yes Monserrat Conti MD   levothyroxine (SYNTHROID) 200 MCG tablet Take 1/2 tab on Monday. Take 1 full tab all other days of the week.  Patient take 5 days a week 2/22/19  Yes Historical Provider, MD   simvastatin (ZOCOR) 20 MG tablet Take 20 mg by mouth nightly 6/5/19  Yes Historical Provider, MD   ammonium lactate (AMLACTIN) 12 % cream Apply to upper arms twice daily 5/18/17   Historical Provider, MD   metaxalone (SKELAXIN) 800 MG tablet Take 400 mg by mouth 3 times daily 8/17/19   Historical Provider, MD   naproxen (NAPROSYN) 500 MG tablet Take 500 mg by mouth 2 times daily (with meals) 1/31/20   Historical Provider, MD   SUMAtriptan (IMITREX) 25 MG tablet Take 25 mg by mouth as needed 6/3/19   Historical Provider, MD   topiramate (TOPAMAX) 25 MG tablet Take 25 mg by mouth nightly 10/28/19   Historical Provider, MD   tretinoin (RETIN-A) 0.05 % cream APPLY AT BEDTIME SPARINGLY 5/18/17   Historical Provider, MD   ondansetron (ZOFRAN) 4 MG tablet Take 1 tablet by mouth every 8 hours as needed for Nausea 5/19/20   Vergia Canutillo, MD       Allergies:  Latex, Penicillins, Phenobarbital, and Adhesive tape    Social History:   TOBACCO:   has no history on file for tobacco.  ETOH:   has no history on file for alcohol. OCCUPATION: Works full-time a person [employer]. Family History:   No family history on file. REVIEW OF SYSTEMS:  Ten systems reviewed and negative except for as above. Physical Exam:    Vitals: There were no vitals taken for this visit. Constitutional: alert, appears stated age and cooperative  Skin: Skin color, texture, turgor normal. No rashes or lesions  Eyes:Eye: Normal external eye, conjunctiva, LOS. ENT: Head: Normocephalic, no lesions, without obvious abnormality.   Neck: no adenopathy, no carotid bruit, no JVD, supple, symmetrical, trachea midline and thyroid not enlarged, symmetric, no tenderness/mass/nodules  Respiratory: Decreased breath sounds throughout with occasional rales no wheezes no rhonchi, dyspnea does not lift limit conversational discussion  Cardiovascular: regular rate and rhythm, S1, S2 normal, no murmur, click, rub or gallop  Gastrointestinal: soft, non-tender; bowel sounds normal; no masses,  no organomegaly  Genitourinary: esophagus are normal. A slight dextroscoliosis is seen in the upper thoracic spine. There is increased kyphosis. Limited survey views of the upper abdomen show that the liver is increased in attenuation. .     1.  No CT evidence of pulmonary embolism in the primary or secondary branches of the pulmonary arteries. 2. Bilateral airspace disease is seen on this examination consistent with Covid 19. All CT scans at this facility use dose modulation, iterative reconstruction, and/or weight based dosing when appropriate to reduce radiation dose to as low as reasonably achievable. PORTABLE  CHEST COMPARISON: January 25, 2021. HISTORY: d dimer TECHNIQUE: AP view FINDINGS: The lung volumes are diminished. Opacities are seen bilaterally, greater in the bases. The right costophrenic angle is blunted. The cardiac silhouette is within normal limits of size. The bony structures are grossly intact. IMPRESSION: Bilateral infiltrates     Xr Chest Portable    Result Date: 2/1/2021  HISTORY: AMY N Duane Reamer is a Female of 38 years age. Evaluate for pulmonary embolism. COMMENTS:  no appetite, dehydrated, shortness of breath COMPARISON: None TECHNIQUE: Thin spiral chest CT was performed per pulmonary embolism protocol, following uneventful administration of intravenous contrast. Amount of intravenous contrast: 100 mL of Isovue-370. MIP images are included. FINDINGS:  There is no intraluminal filling defect identified within the central and proximal segmental pulmonary arteries to suggest pulmonary embolism. The thoracic aorta shows no evidence for dissection. The heart is mildly enlarged. Patchy airspace disease with groundglass opacities is seen throughout both lungs. There is a peripheral predominance. Consolidation is seen in both bases. Trace pleural fluid is seen. The central airways and visualized portion of the esophagus are normal. A slight dextroscoliosis is seen in the upper thoracic spine. There is increased kyphosis.  Limited survey views of the upper abdomen show that the liver is increased in attenuation. .     1.  No CT evidence of pulmonary embolism in the primary or secondary branches of the pulmonary arteries. 2. Bilateral airspace disease is seen on this examination consistent with Covid 19. All CT scans at this facility use dose modulation, iterative reconstruction, and/or weight based dosing when appropriate to reduce radiation dose to as low as reasonably achievable. PORTABLE  CHEST COMPARISON: January 25, 2021. HISTORY: d dimer TECHNIQUE: AP view FINDINGS: The lung volumes are diminished. Opacities are seen bilaterally, greater in the bases. The right costophrenic angle is blunted. The cardiac silhouette is within normal limits of size. The bony structures are grossly intact. IMPRESSION: Bilateral infiltrates       EKG: Sinus rhythm, inferior T wave inversion    Assessment and Plan   1. Acute Hypoxic Respiratory Failure 2/2 COVID 19 PNA: decadron. Remdisovir. Mucinex, Combivent. 2. T wave inversion on EKG from [place]. Trend troponins repeat EKG  3. Thyroid disease continue Synthroid  4. DVT PPX lovenox 30 BID per covid protocol.      Patient Active Problem List   Diagnosis Code    COVID-19 U07.1       Bhavana Ye DO  Admitting Hospitalist    Emergency Contact:

## 2021-02-01 NOTE — LETTER
[facility]  [address] [id number]  Phone: [phone]         February 5, 2021     Patient: Cecil Germain   YOB: 1985   Date of Visit: 2/1/2021       To Whom It May Concern:    Michelle Ware was seen and treated at [facility] on 2/1/2021 through 2/5/21. She was diagnosed with COVID 19 1/25/2021. Per CDC guidelines she is considered non infectious as of 2/8/2021.            Sincerely,        Krystle Pitts DO        Signature:__________________________________

## 2021-02-01 NOTE — ACP (ADVANCE CARE PLANNING)
Advance Care Planning     Advance Care Planning Activator (Inpatient)  Conversation Note      Date of ACP Conversation: 2/1/2021    Conversation Conducted with: Patient with Decision Making Capacity    ACP Activator: [address]: Stephen Menonklevermadiha    Current Designated Health Care Decision Maker: Ira Brown, MOM    Click here to complete Healthcare Decision Makers including section of the Healthcare Decision Maker Relationship (ie \"Primary\")  Today we documented Decision Maker(s) consistent with Legal Next of Kin hierarchy. Care Preferences    Ventilation: \"If you were in your present state of health and suddenly became very ill and were unable to breathe on your own, what would your preference be about the use of a ventilator (breathing machine) if it were available to you? \"      Would the patient desire the use of ventilator (breathing machine)?: yes    \"If your health worsens and it becomes clear that your chance of recovery is unlikely, what would your preference be about the use of a ventilator (breathing machine) if it were available to you? \"     Would the patient desire the use of ventilator (breathing machine)?: Yes      Resuscitation  \"CPR works best to restart the heart when there is a sudden event, like a heart attack, in someone who is otherwise healthy. Unfortunately, CPR does not typically restart the heart for people who have serious health conditions or who are very sick. \"    \"In the event your heart stopped as a result of an underlying serious health condition, would you want attempts to be made to restart your heart (answer \"yes\" for attempt to resuscitate) or would you prefer a natural death (answer \"no\" for do not attempt to resuscitate)? \" yes       [] Yes   [] No   Educated Cassy / Karime West regarding differences between Advance Directives and portable DNR orders.     Length of ACP Conversation in minutes:      Brady Hines Outcomes:  [] ACP discussion completed  [] Existing advance directive reviewed with patient; no changes to patient's previously recorded wishes  [] New Advance Directive completed  [] Portable Do Not Rescitate prepared for Provider review and signature  [] POLST/POST/MOLST/MOST prepared for Provider review and signature      Follow-up plan:    [] Schedule follow-up conversation to continue planning  [] Referred individual to Provider for additional questions/concerns   [] Advised patient/agent/surrogate to review completed ACP document and update if needed with changes in condition, patient preferences or care setting    [] This note routed to one or more involved healthcare providers

## 2021-02-01 NOTE — CARE COORDINATION
[facility] EMERGENCY [facility] AT [place] Case Management Initial Discharge Assessment    Met with Patient to discuss discharge plan. DC PLAN DISCUSSED WITH PATIENT VIA TELEPHONE. PT IS ALERT/ORIENTED. PCP: Mary July, APRN - [facility]                                Date of Last Visit: UNSURE, ABOUT 1YR    If no PCP, list provided? N/A    Discharge Planning    Living Arrangements: independently at home    Who do you live with? BOYFRIEND    Who helps you with your care:  self    If lives at home:     Do you have any barriers navigating in your home? no    Patient can perform ADL? Yes    Current Services (outpatient and in home) :  None    Dialysis: No    Is transportation available to get to your appointments? Yes    DME Equipment:  no    Respiratory equipment: None    Respiratory provider:  no     Ph[facility]acy:  [facility]    Consult with Medication Assistance Program?  No      Patient agreeable to [facility]? Declined    Patient agreeable to SNF/Rehab? Declined    Other discharge needs identified? N/A    Freedom of choice list provided with basic dialogue that supports the patient's individualized plan of care/goals and shares the quality data associated with the providers. Yes    Does Patient Have a High-Risk for Readmission Diagnosis (CHF, PN, MI, COPD)? No      The plan for Transition of Care is related to the following treatment goals:HD STABLE, O2 SAT STABLE, O2 NEEDS ADDRESSED    Initial Discharge Plan? (Note: please see concurrent daily documentation for any updates after initial note).     HOME WITH BOYFRIEND, DENIES NEEDS AT THIS TIME    The Patient  was provided with choice of any post-acute providers for care and equipment and agrees with discharge plan  Yes  9%    Electronically signed by Precious Robledo RN on 2/1/2021 at 10:12 AM

## 2021-02-01 NOTE — PROGRESS NOTES
[facility] Respiratory Therapy Evaluation   Current Order:  COMBIVENT Q6 PRN      Home Regimen:   NONE    Ordering Physician: RAZ  Re-evaluation Date:  EVAL DONE     Diagnosis: COVID-19      Patient Status: Stable / Unstable + Physician notified    The following MDI Criteria must be met in order to convert aerosol to MDI with spacer. If unable to meet, MDI will be converted to aerosol:  []  Patient able to demonstrate the ability to use MDI effectively  []  Patient alert and cooperative  []  Patient able to take deep breath with 5-10 second hold  []  Medication(s) available in this delivery method   []  Peak flow greater than or equal to 200 ml/min            Current Order Substituted To  (same drug, same frequency)   Aerosol to MDI [] Albuterol Sulfate 0.083% unit dose by aerosol Albuterol Sulfate MDI 2 puffs by inhalation with spacer    [] Levalbuterol 1.25 mg unit dose by aerosol Levalbuterol MDI 2 puffs by inhalation with spacer    [] Levalbuterol 0.63 mg unit dose by aerosol Levalbuterol MDI 2 puffs by inhalation with spacer    [] Ipratropium Bromide 0.02% unit dose by aerosol Ipratropium Bromide MDI 2 puffs by inhalation with spacer    [] Duoneb (Ipratropium + Albuterol) unit dose by aerosol Ipratropium MDI + Albuterol MDI 2 puffs by inhalation w/spacer   MDI to Aerosol [] Albuterol Sulfate MDI Albuterol Sulfate 0.083% unit dose by aerosol    [] Levalbuterol MDI 2 puffs by inhalation Levalbuterol 1.25 mg unit dose by aerosol    [] Ipratropium Bromide MDI by inhalation Ipratropium Bromide 0.02% unit dose by aerosol    [] Combivent (Ipratropium + Albuterol) MDI by inhalation Duoneb (Ipratropium + Albuterol) unit dose by aerosol   Treatment Assessment [Frequency/Schedule]:  Change frequency to: ______________________NO CHANGE____________________________per Protocol, P&T, MEC      Points 0 1 2 3 4   Pulmonary Status  Non-Smoker  []   Smoking history   < 20 pack years  []   Smoking history  ?  20 pack years  [] Pulmonary Disorder  (acute or chronic)  [x]   Severe or Chronic w/ Exacerbation  []     Surgical Status No [x]   Surgeries     General []   Surgery Lower []   Abdominal Thoracic or []   Upper Abdominal Thoracic with  PulmonaryDisorder  []     Chest X-ray Clear/Not  Ordered     []  Chronic Changes  Results Pending  []  Infiltrates, atelectasis, pleural effusion, or edema  [x]  Infiltrates in more than one lobe []  Infiltrate + Atelectasis, &/or pleural effusion  []    Respiratory Pattern Regular,  RR = 12-20 [x]  Increased,  RR = 21-25 []  GRANT, irregular,  or RR = 26-30 []  Decreased FEV1  or RR = 31-35 []  Severe SOB, use  of accessory muscles, or RR ? 35  []    Mental Status Alert, oriented,  Cooperative [x]  Confused but Follows commands []  Lethargic or unable to follow commands []  Obtunded  []  Comatose  []    Breath Sounds Clear to  auscultation  [x]  Decreased unilaterally or  in bases only []  Decreased  bilaterally  []  Crackles or intermittent wheezes []  Wheezes []    Cough Strong, Spontan., & nonproductive [x]  Strong,  spontaneous, &  productive []  Weak,  Nonproductive []  Weak, productive or  with wheezes []  No spontaneous  cough or may require suctioning []    Level of Activity Ambulatory [x]  Ambulatory w/ Assist  []  Non-ambulatory []  Paraplegic []  Quadriplegic []    Total    Score:___5____     Triage Score:__5______      Tri       Triage:     1. (>20) Freq: Q3    2. (16-20) Freq: Q4   3. (11-15) Freq: QID & Albuterol Q2 PRN    4. (6-10) Freq: TID & Albuterol Q2 PRN    5. (0-5) Freq Q4prn

## 2021-02-02 LAB
ALBUMIN SERPL-MCNC: 3.3 G/DL (ref 3.5–4.6)
ALP BLD-CCNC: 49 U/L (ref 40–130)
ALT SERPL-CCNC: 10 U/L (ref 0–33)
ANION GAP SERPL CALCULATED.3IONS-SCNC: 16 MEQ/L (ref 9–15)
ANISOCYTOSIS: ABNORMAL
APTT: 35 SEC (ref 24.4–36.8)
AST SERPL-CCNC: 26 U/L (ref 0–35)
BANDED NEUTROPHILS RELATIVE PERCENT: 2 % (ref 5–11)
BASOPHILS ABSOLUTE: 0 K/UL (ref 0–0.2)
BASOPHILS RELATIVE PERCENT: 0.1 %
BILIRUB SERPL-MCNC: 0.4 MG/DL (ref 0.2–0.7)
BUN BLDV-MCNC: 12 MG/DL (ref 6–20)
BURR CELLS: ABNORMAL
C-REACTIVE PROTEIN: 54.2 MG/L (ref 0–5)
CALCIUM SERPL-MCNC: 8.7 MG/DL (ref 8.5–9.9)
CHLORIDE BLD-SCNC: 104 MEQ/L (ref 95–107)
CO2: 20 MEQ/L (ref 20–31)
CREAT SERPL-MCNC: 0.54 MG/DL (ref 0.5–0.9)
D DIMER: 1.16 MG/L FEU (ref 0–0.5)
EOSINOPHILS ABSOLUTE: 0 K/UL (ref 0–0.7)
EOSINOPHILS RELATIVE PERCENT: 0 %
FERRITIN: 615.1 NG/ML (ref 13–150)
FIBRINOGEN: 540 MG/DL (ref 235–507)
GFR AFRICAN AMERICAN: >60
GFR NON-AFRICAN AMERICAN: >60
GLOBULIN: 3.1 G/DL (ref 2.3–3.5)
GLUCOSE BLD-MCNC: 98 MG/DL (ref 70–99)
HCT VFR BLD CALC: 40.6 % (ref 37–47)
HEMOGLOBIN: 13.5 G/DL (ref 12–16)
INR BLD: 1
LACTATE DEHYDROGENASE: 416 U/L (ref 135–214)
LYMPHOCYTES ABSOLUTE: 0.4 K/UL (ref 1–4.8)
LYMPHOCYTES RELATIVE PERCENT: 4 %
MCH RBC QN AUTO: 30.7 PG (ref 27–31.3)
MCHC RBC AUTO-ENTMCNC: 33.2 % (ref 33–37)
MCV RBC AUTO: 92.3 FL (ref 82–100)
MONOCYTES ABSOLUTE: 0.3 K/UL (ref 0.2–0.8)
MONOCYTES RELATIVE PERCENT: 2.7 %
NEUTROPHILS ABSOLUTE: 8.6 K/UL (ref 1.4–6.5)
NEUTROPHILS RELATIVE PERCENT: 91 %
PDW BLD-RTO: 13.8 % (ref 11.5–14.5)
PLATELET # BLD: 233 K/UL (ref 130–400)
PLATELET SLIDE REVIEW: NORMAL
POIKILOCYTES: ABNORMAL
POTASSIUM REFLEX MAGNESIUM: 4.1 MEQ/L (ref 3.4–4.9)
PROCALCITONIN: 0.03 NG/ML (ref 0–0.15)
PROTHROMBIN TIME: 12.8 SEC (ref 12.3–14.9)
RBC # BLD: 4.4 M/UL (ref 4.2–5.4)
SODIUM BLD-SCNC: 140 MEQ/L (ref 135–144)
TOTAL PROTEIN: 6.4 G/DL (ref 6.3–8)
TROPONIN: <0.01 NG/ML (ref 0–0.01)
WBC # BLD: 9.2 K/UL (ref 4.8–10.8)

## 2021-02-02 PROCEDURE — 36415 COLL VENOUS BLD VENIPUNCTURE: CPT

## 2021-02-02 PROCEDURE — 85379 FIBRIN DEGRADATION QUANT: CPT

## 2021-02-02 PROCEDURE — 94669 MECHANICAL CHEST WALL OSCILL: CPT

## 2021-02-02 PROCEDURE — 6370000000 HC RX 637 (ALT 250 FOR IP): Performed by: INTERNAL MEDICINE

## 2021-02-02 PROCEDURE — 2580000003 HC RX 258: Performed by: INTERNAL MEDICINE

## 2021-02-02 PROCEDURE — XW033E5 INTRODUCTION OF REMDESIVIR ANTI-INFECTIVE INTO PERIPHERAL VEIN, PERCUTANEOUS APPROACH, NEW TECHNOLOGY GROUP 5: ICD-10-PCS | Performed by: INTERNAL MEDICINE

## 2021-02-02 PROCEDURE — 2500000003 HC RX 250 WO HCPCS: Performed by: INTERNAL MEDICINE

## 2021-02-02 PROCEDURE — 83615 LACTATE (LD) (LDH) ENZYME: CPT

## 2021-02-02 PROCEDURE — 85610 PROTHROMBIN TIME: CPT

## 2021-02-02 PROCEDURE — 84484 ASSAY OF TROPONIN QUANT: CPT

## 2021-02-02 PROCEDURE — 94760 N-INVAS EAR/PLS OXIMETRY 1: CPT

## 2021-02-02 PROCEDURE — 6360000002 HC RX W HCPCS: Performed by: INTERNAL MEDICINE

## 2021-02-02 PROCEDURE — 80053 COMPREHEN METABOLIC PANEL: CPT

## 2021-02-02 PROCEDURE — 82728 ASSAY OF FERRITIN: CPT

## 2021-02-02 PROCEDURE — 85730 THROMBOPLASTIN TIME PARTIAL: CPT

## 2021-02-02 PROCEDURE — 99223 1ST HOSP IP/OBS HIGH 75: CPT | Performed by: INTERNAL MEDICINE

## 2021-02-02 PROCEDURE — 94761 N-INVAS EAR/PLS OXIMETRY MLT: CPT

## 2021-02-02 PROCEDURE — 85025 COMPLETE CBC W/AUTO DIFF WBC: CPT

## 2021-02-02 PROCEDURE — 94150 VITAL CAPACITY TEST: CPT

## 2021-02-02 PROCEDURE — 1210000000 HC MED SURG R&B

## 2021-02-02 PROCEDURE — 2060000000 HC ICU INTERMEDIATE R&B

## 2021-02-02 PROCEDURE — 93010 ELECTROCARDIOGRAM REPORT: CPT | Performed by: INTERNAL MEDICINE

## 2021-02-02 PROCEDURE — 2700000000 HC OXYGEN THERAPY PER DAY

## 2021-02-02 PROCEDURE — 86140 C-REACTIVE PROTEIN: CPT

## 2021-02-02 PROCEDURE — 85384 FIBRINOGEN ACTIVITY: CPT

## 2021-02-02 PROCEDURE — 84145 PROCALCITONIN (PCT): CPT

## 2021-02-02 RX ADMIN — GUAIFENESIN 600 MG: 600 TABLET ORAL at 21:56

## 2021-02-02 RX ADMIN — Medication 10 ML: at 22:54

## 2021-02-02 RX ADMIN — ATORVASTATIN CALCIUM 20 MG: 20 TABLET, FILM COATED ORAL at 21:56

## 2021-02-02 RX ADMIN — GUAIFENESIN 600 MG: 600 TABLET ORAL at 09:47

## 2021-02-02 RX ADMIN — LEVOTHYROXINE SODIUM 200 MCG: 100 TABLET ORAL at 05:36

## 2021-02-02 RX ADMIN — REMDESIVIR 100 MG: 100 INJECTION, POWDER, LYOPHILIZED, FOR SOLUTION INTRAVENOUS at 15:28

## 2021-02-02 RX ADMIN — Medication 10 ML: at 09:47

## 2021-02-02 RX ADMIN — ENOXAPARIN SODIUM 30 MG: 30 INJECTION SUBCUTANEOUS at 21:56

## 2021-02-02 RX ADMIN — ENOXAPARIN SODIUM 30 MG: 30 INJECTION SUBCUTANEOUS at 09:47

## 2021-02-02 RX ADMIN — DEXAMETHASONE 6 MG: 2 TABLET ORAL at 09:47

## 2021-02-02 NOTE — CONSULTS
Pulmonary and Critical Care Medicine  Consult Note  Encounter Date: 2021 2:38 PM    Ms. Kilo Phillip is a 38 y.o. female  : 1985  Requesting Provider: Angelina Laughlin DO    Reason for request: Acute hypoxic respiratory failure            HISTORY OF PRESENT ILLNESS:    Patient is 38 y.o. presents with worsening shortness of breath, symptoms started almost a week ago, with fever, headache, nausea, and diarrhea along with dry cough and progressive shortness of breath. Patient is COVID-19 positive, she is currently in prone position, feels slightly better, she is currently on 15 L oxygen via high flow bubbler, no fever since admission, blood pressure is okay and heart rate is okay. Past Medical History:        Diagnosis Date    Hyperlipidemia     Thyroid disease        Past Surgical History:    No past surgical history on file. Social History:       She never smoked  Family History:   No family history on file. No family history of lung disease  Allergies:  Latex, Penicillins, Phenobarbital, and Adhesive tape        MEDICATIONS during current hospitalization:    Continuous Infusions:    Scheduled Meds:   sodium chloride flush  10 mL Intravenous 2 times per day    enoxaparin  30 mg Subcutaneous BID    dexamethasone  6 mg Oral Daily    guaiFENesin  600 mg Oral BID    levothyroxine  200 mcg Oral Daily    atorvastatin  20 mg Oral Nightly    remdesivir IVPB  100 mg Intravenous Q24H       PRN Meds:sodium chloride flush, promethazine **OR** ondansetron, polyethylene glycol, acetaminophen **OR** acetaminophen, ibuprofen, albuterol-ipratropium        REVIEW OF SYSTEMS:  ROS: 10 organs review of system is done including general, psychological, ENT, hematological, endocrine, respiratory, cardiovascular, gastrointestinal, musculoskeletal, neurological,  allergy and Immunology is done and is otherwise negative.     PHYSICAL EXAM:    Vitals:  /63   Pulse 68   Temp 98.2 °F (36.8 °C) (Oral) Resp 16   Wt 260 lb 9.3 oz (118.2 kg)   SpO2 95%   BMI 44.73 kg/m²     General: alert, cooperative, no distress  Head: normocephalic, atraumatic  Eyes:No gross abnormalities. ENT:  MMM no lesions  Neck:  supple and no masses  Chest : Few rales bilaterally, no wheezing, nontender, tympanic  Heart[de-identified] Heart sounds are normal.  Regular rate and rhythm without murmur, gallop or rub. ABD: Done in prone position symmetric, soft, non-tender, no guarding or rebound  Musculoskeletal : no cyanosis, no clubbing and no edema  Neuro:  Grossly normal  Skin: No rashes or nodules noted. Lymph node:  no cervical nodes  Urology: No [place]   Psychiatric: appropriate        Data Review  Recent Labs     02/01/21  0336 02/01/21  1035 02/02/21  0639   WBC 3.9* 2.6* 9.2   HGB 11.4* 12.6 13.5   HCT 33.9* 38.5 40.6    199 233      Recent Labs     02/01/21  0336 02/01/21  1035 02/02/21  0639    132* 140   K 3.5 3.8 4.1   CL 98 97 104   CO2 26 25 20   BUN 9 10 12   CREATININE 0.75 0.70 0.54   GLUCOSE 111* 134* 98       MV Settings: ABGs: No results for input(s): PHART, MWP9KEV, PO2ART, IOI9LVI, BEART, F9EWIFFN, ZSY6VAK in the last 72 hours. O2 Device: High flow nasal cannula  O2 Flow Rate (L/min): 15 L/min  No results found for: [address]    Radiology  I personally reviewed imaging studies and groundglass opacities, with dense consolidation at the bases bilaterally along with trace pleural effusion.   No PE        Assessment, plan:   Patient is at risk due to    · Acute hypoxic respiratory failure  · COVID-19 pneumonia  · Obesity    Recommendations  · O2 to keep sat 92 to 95%  · Prone position as tolerated  · Incentive spirometry  · Dexamethasone 10 mg p.o. daily for 10 days  · Remdesivir for 5 days  · Maintain euvolemic, watch urine output and volume status           Thank you for consultation    Electronically signed by Sharmila Walker MD, [facility],  on 2/2/2021 at 2:38 PM

## 2021-02-03 LAB
ALBUMIN SERPL-MCNC: 3.1 G/DL (ref 3.5–4.6)
ALP BLD-CCNC: 44 U/L (ref 40–130)
ALT SERPL-CCNC: 30 U/L (ref 0–33)
ANION GAP SERPL CALCULATED.3IONS-SCNC: 11 MEQ/L (ref 9–15)
AST SERPL-CCNC: 53 U/L (ref 0–35)
BASOPHILS ABSOLUTE: 0 K/UL (ref 0–0.2)
BASOPHILS RELATIVE PERCENT: 0.1 %
BILIRUB SERPL-MCNC: 0.4 MG/DL (ref 0.2–0.7)
BUN BLDV-MCNC: 13 MG/DL (ref 6–20)
C-REACTIVE PROTEIN: 35.2 MG/L (ref 0–5)
CALCIUM SERPL-MCNC: 8.2 MG/DL (ref 8.5–9.9)
CHLORIDE BLD-SCNC: 104 MEQ/L (ref 95–107)
CO2: 25 MEQ/L (ref 20–31)
CREAT SERPL-MCNC: 0.52 MG/DL (ref 0.5–0.9)
D DIMER: 0.7 MG/L FEU (ref 0–0.5)
EKG ATRIAL RATE: 49 BPM
EKG ATRIAL RATE: 56 BPM
EKG P AXIS: 32 DEGREES
EKG P AXIS: 37 DEGREES
EKG P-R INTERVAL: 144 MS
EKG P-R INTERVAL: 152 MS
EKG Q-T INTERVAL: 438 MS
EKG Q-T INTERVAL: 440 MS
EKG QRS DURATION: 100 MS
EKG QRS DURATION: 106 MS
EKG QTC CALCULATION (BAZETT): 397 MS
EKG QTC CALCULATION (BAZETT): 422 MS
EKG R AXIS: 1 DEGREES
EKG R AXIS: 2 DEGREES
EKG T AXIS: -10 DEGREES
EKG T AXIS: -5 DEGREES
EKG VENTRICULAR RATE: 49 BPM
EKG VENTRICULAR RATE: 56 BPM
EOSINOPHILS ABSOLUTE: 0 K/UL (ref 0–0.7)
EOSINOPHILS RELATIVE PERCENT: 0 %
GFR AFRICAN AMERICAN: >60
GFR NON-AFRICAN AMERICAN: >60
GLOBULIN: 3.1 G/DL (ref 2.3–3.5)
GLUCOSE BLD-MCNC: 98 MG/DL (ref 70–99)
HCT VFR BLD CALC: 35.5 % (ref 37–47)
HEMOGLOBIN: 11.8 G/DL (ref 12–16)
LYMPHOCYTES ABSOLUTE: 0.8 K/UL (ref 1–4.8)
LYMPHOCYTES RELATIVE PERCENT: 12.3 %
MCH RBC QN AUTO: 30.7 PG (ref 27–31.3)
MCHC RBC AUTO-ENTMCNC: 33.3 % (ref 33–37)
MCV RBC AUTO: 92.4 FL (ref 82–100)
MONOCYTES ABSOLUTE: 0.4 K/UL (ref 0.2–0.8)
MONOCYTES RELATIVE PERCENT: 7 %
NEUTROPHILS ABSOLUTE: 5.1 K/UL (ref 1.4–6.5)
NEUTROPHILS RELATIVE PERCENT: 80.6 %
PDW BLD-RTO: 14 % (ref 11.5–14.5)
PLATELET # BLD: 275 K/UL (ref 130–400)
POTASSIUM REFLEX MAGNESIUM: 4.1 MEQ/L (ref 3.4–4.9)
PROCALCITONIN: 0.03 NG/ML (ref 0–0.15)
RBC # BLD: 3.84 M/UL (ref 4.2–5.4)
SODIUM BLD-SCNC: 140 MEQ/L (ref 135–144)
TOTAL PROTEIN: 6.2 G/DL (ref 6.3–8)
WBC # BLD: 6.3 K/UL (ref 4.8–10.8)

## 2021-02-03 PROCEDURE — 94761 N-INVAS EAR/PLS OXIMETRY MLT: CPT

## 2021-02-03 PROCEDURE — 6360000002 HC RX W HCPCS: Performed by: INTERNAL MEDICINE

## 2021-02-03 PROCEDURE — APPNB45 APP NON BILLABLE 31-45 MINUTES: Performed by: PHYSICIAN ASSISTANT

## 2021-02-03 PROCEDURE — 36415 COLL VENOUS BLD VENIPUNCTURE: CPT

## 2021-02-03 PROCEDURE — 6370000000 HC RX 637 (ALT 250 FOR IP): Performed by: INTERNAL MEDICINE

## 2021-02-03 PROCEDURE — 2500000003 HC RX 250 WO HCPCS: Performed by: INTERNAL MEDICINE

## 2021-02-03 PROCEDURE — 84145 PROCALCITONIN (PCT): CPT

## 2021-02-03 PROCEDURE — 80053 COMPREHEN METABOLIC PANEL: CPT

## 2021-02-03 PROCEDURE — 2060000000 HC ICU INTERMEDIATE R&B

## 2021-02-03 PROCEDURE — 85025 COMPLETE CBC W/AUTO DIFF WBC: CPT

## 2021-02-03 PROCEDURE — 85379 FIBRIN DEGRADATION QUANT: CPT

## 2021-02-03 PROCEDURE — 2700000000 HC OXYGEN THERAPY PER DAY

## 2021-02-03 PROCEDURE — 93005 ELECTROCARDIOGRAM TRACING: CPT | Performed by: PHYSICIAN ASSISTANT

## 2021-02-03 PROCEDURE — 2580000003 HC RX 258: Performed by: INTERNAL MEDICINE

## 2021-02-03 PROCEDURE — 99232 SBSQ HOSP IP/OBS MODERATE 35: CPT | Performed by: INTERNAL MEDICINE

## 2021-02-03 PROCEDURE — 86140 C-REACTIVE PROTEIN: CPT

## 2021-02-03 PROCEDURE — 99254 IP/OBS CNSLTJ NEW/EST MOD 60: CPT | Performed by: INTERNAL MEDICINE

## 2021-02-03 RX ORDER — KETOROLAC TROMETHAMINE 15 MG/ML
15 INJECTION, SOLUTION INTRAMUSCULAR; INTRAVENOUS EVERY 6 HOURS PRN
Status: DISCONTINUED | OUTPATIENT
Start: 2021-02-03 | End: 2021-02-05 | Stop reason: HOSPADM

## 2021-02-03 RX ADMIN — DEXAMETHASONE 6 MG: 2 TABLET ORAL at 08:52

## 2021-02-03 RX ADMIN — GUAIFENESIN 600 MG: 600 TABLET ORAL at 20:25

## 2021-02-03 RX ADMIN — Medication 10 ML: at 08:53

## 2021-02-03 RX ADMIN — GUAIFENESIN 600 MG: 600 TABLET ORAL at 08:52

## 2021-02-03 RX ADMIN — Medication 10 ML: at 20:25

## 2021-02-03 RX ADMIN — ENOXAPARIN SODIUM 30 MG: 30 INJECTION SUBCUTANEOUS at 08:52

## 2021-02-03 RX ADMIN — ATORVASTATIN CALCIUM 20 MG: 20 TABLET, FILM COATED ORAL at 20:25

## 2021-02-03 RX ADMIN — LEVOTHYROXINE SODIUM 200 MCG: 100 TABLET ORAL at 05:25

## 2021-02-03 RX ADMIN — IBUPROFEN 400 MG: 400 TABLET, FILM COATED ORAL at 09:12

## 2021-02-03 RX ADMIN — ENOXAPARIN SODIUM 30 MG: 30 INJECTION SUBCUTANEOUS at 20:24

## 2021-02-03 RX ADMIN — REMDESIVIR 100 MG: 100 INJECTION, POWDER, LYOPHILIZED, FOR SOLUTION INTRAVENOUS at 15:47

## 2021-02-03 ASSESSMENT — ENCOUNTER SYMPTOMS
SHORTNESS OF BREATH: 1
CHEST TIGHTNESS: 1
NAUSEA: 0
COLOR CHANGE: 0
VOMITING: 0

## 2021-02-03 ASSESSMENT — PAIN SCALES - GENERAL: PAINLEVEL_OUTOF10: 5

## 2021-02-03 NOTE — PROGRESS NOTES
above  Medications:  Reviewed    Infusion Medications:   Scheduled Medications:    sodium chloride flush  10 mL Intravenous 2 times per day    enoxaparin  30 mg Subcutaneous BID    dexamethasone  6 mg Oral Daily    guaiFENesin  600 mg Oral BID    levothyroxine  200 mcg Oral Daily    atorvastatin  20 mg Oral Nightly    remdesivir IVPB  100 mg Intravenous Q24H     PRN Meds: sodium chloride flush, promethazine **OR** ondansetron, polyethylene glycol, acetaminophen **OR** acetaminophen, ibuprofen, albuterol-ipratropium    Labs:   Recent Labs     02/01/21  0336 02/01/21  1035 02/02/21  0639   WBC 3.9* 2.6* 9.2   HGB 11.4* 12.6 13.5   HCT 33.9* 38.5 40.6    199 233     Recent Labs     02/01/21  0336 02/01/21  1035 02/02/21  0639    132* 140   K 3.5 3.8 4.1   CL 98 97 104   CO2 26 25 20   BUN 9 10 12   CREATININE 0.75 0.70 0.54   CALCIUM 8.2* 8.2* 8.7     Recent Labs     02/01/21  1035 02/02/21  0639   AST 22 26   ALT 9 10   BILITOT 0.5 0.4   ALKPHOS 45 49     Recent Labs     02/02/21  0639   INR 1.0     Recent Labs     02/02/21  0010 02/02/21  0639 02/02/21  1409   TROPONINI <0.010 <0.010 <0.010       Urinalysis:   Lab Results   Component Value Date    NITRU Negative 05/19/2020    WBCUA  05/19/2020    BACTERIA MODERATE 05/19/2020    RBCUA 10-20 05/19/2020    BLOODU Large 05/19/2020    SPECGRAV >=1.030 05/19/2020    GLUCOSEU Negative 05/19/2020       Radiology:   Most recent    Chest CT      WITH CONTRAST:No results found for this or any previous visit. WITHOUT CONTRAST: No results found for this or any previous visit. CXR      2-view: No results found for this or any previous visit. Portable:   Results for orders placed during the hospital encounter of 02/01/21   XR CHEST PORTABLE    Narrative HISTORY: STEWART Swartz is a Female of 38 years age. Evaluate for pulmonary embolism.     COMMENTS:  no appetite, dehydrated, shortness of breath       COMPARISON: None    TECHNIQUE:   Thin spiral chest CT was performed per pulmonary embolism protocol, following uneventful administration of intravenous contrast.   Amount of intravenous contrast: 100 mL of Isovue-370. MIP images are included. FINDINGS:    There is no intraluminal filling defect identified within the central and proximal segmental pulmonary arteries to suggest pulmonary embolism. The thoracic aorta shows no evidence for dissection. The heart is mildly enlarged. Patchy airspace disease with groundglass opacities is seen throughout both lungs. There is a peripheral predominance. Consolidation is seen in both bases. Trace pleural fluid is seen. The central airways and visualized portion of the esophagus are   normal.  A slight dextroscoliosis is seen in the upper thoracic spine. There is increased kyphosis. Limited survey views of the upper abdomen show that the liver is increased in attenuation. .      Impression 1. No CT evidence of pulmonary embolism in the primary or secondary branches of the pulmonary arteries. 2. Bilateral airspace disease is seen on this examination consistent with Covid 19. All CT scans at this facility use dose modulation, iterative reconstruction, and/or weight based dosing when appropriate to reduce radiation dose to as low as reasonably achievable. PORTABLE  CHEST    COMPARISON: January 25, 2021. HISTORY: d dimer     TECHNIQUE: AP view      FINDINGS:  The lung volumes are diminished. Opacities are seen bilaterally, greater in the bases. The right costophrenic angle is blunted. The cardiac silhouette is within normal limits of size. The bony structures are grossly intact. IMPRESSION: Bilateral infiltrates         Echo No results found for this or any previous visit. Assessment/Plan:    Active Hospital Problems    Diagnosis Date Noted    COVID-19 [U07.1] 02/01/2021     1. Acute Hypoxic Respiratory Failure 2/2 COVID 19 PNA: decadron daily x10 doses. Remdisovir.  Mucinex, Combivent. Pulmonary consult  2. T wave inversion on EKG from [place]. Trend troponins repeat EKG suggests early repolarization, relatively unchanged monitor for worsening bradycardia  3. Hypo-thyroid disease continue Synthroid  4. DVT PPX lovenox 30 BID per covid protocol. Additional work up or/and treatment plan may be added today or then after based on clinical progression. I am managing a portion of pt care. Some medical issues are handled byother specialists. Additional work up and treatment should be done in out pt setting by pt PCP and other out pt providers. In addition to examining and evaluating pt, I spent additional time explaining care, normaland abnormal findings, and treatment plan. All of pt questions were answered. Counseling, diet and education were provided. Case will be discussed with nursing staff when appropriate. Family will be updated if and whenappropriate.       Electronically signed by Raheem Enriquez DO on 2/2/2021 at 9:31 PM

## 2021-02-03 NOTE — PROGRESS NOTES
INPATIENT PROGRESS NOTES    PATIENT NAME: Summer Jones  MRN: [id number]  SERVICE DATE:  February 3, 2021   SERVICE TIME:  2:08 PM      PRIMARY SERVICE: Pulmonary Disease    CHIEF COMPLAINTS: COVID-19 infection with acute hypoxic respiratory failure    INTERVAL HPI: Patient seen and examined at bedside, Interval Notes, orders reviewed. Nursing notes noted    Patient report feeling better today, shortness of breath slightly improved, still has dry cough, she is doing prone position frequently during the day, she still on high flow oxygen currently on 8 L saturation 98% which is improvement from yesterday 15 L/min. No fever, no nausea no vomiting    Review of system:     GI Abdominal pain No  Skin Rash No    Social History     Tobacco Use    Smoking status: Not on file   Substance Use Topics    Alcohol use: Not on file     No family history on file. OBJECTIVE    Body mass index is 44.73 kg/m². PHYSICAL EXAM:  Vitals:  /60   Pulse 65   Temp 97.3 °F (36.3 °C) (Oral)   Resp 18   Wt 260 lb 9.3 oz (118.2 kg)   SpO2 98%   BMI 44.73 kg/m²     General: alert, cooperative, no distress  Head: normocephalic, atraumatic  Eyes:No gross abnormalities. ENT:  MMM no lesions  Neck:  supple and no masses  Chest : Few rales bilaterally, otherwise clear no wheezing, nontender, tympanic  Heart[de-identified] Heart sounds are normal.  Regular rate and rhythm without murmur, gallop or rub. ABD:  symmetric, soft, non-tender, no guarding or rebound  Musculoskeletal : no cyanosis, no clubbing and no edema  Neuro:  Grossly normal  Skin: No rashes or nodules noted.   Lymph node:  no cervical nodes  Urology: No [place]   Psychiatric: appropriate    DATA:   Recent Labs     02/02/21  0639 02/03/21  0643   WBC 9.2 6.3   HGB 13.5 11.8*   HCT 40.6 35.5*   MCV 92.3 92.4    275     Recent Labs     02/02/21  0639 02/03/21  0643    140   K 4.1 4.1    104   CO2 20 25   BUN 12 13   CREATININE 0.54 0.52   GLUCOSE 98 98   CALCIUM 8.7 8.2*   PROT 6.4 6.2*   LABALBU 3.3* 3.1*   BILITOT 0.4 0.4   ALKPHOS 49 44   AST 26 53*   ALT 10 30   LABGLOM >60.0 >60.0   GFRAA >60.0 >60.0   GLOB 3.1 3.1       MV Settings:          No results for input(s): PHART, OJR8VBD, PO2ART, EVA2ZNK, BEART, Y4DJOAPT in the last 72 hours. O2 Device: High flow nasal cannula  O2 Flow Rate (L/min): 8 L/min    DIET CARDIAC; Safety Tray; Safety Tray (Disposables)     MEDICATIONS during current hospitalization:    Continuous Infusions:    Scheduled Meds:   sodium chloride flush  10 mL Intravenous 2 times per day    enoxaparin  30 mg Subcutaneous BID    dexamethasone  6 mg Oral Daily    guaiFENesin  600 mg Oral BID    levothyroxine  200 mcg Oral Daily    atorvastatin  20 mg Oral Nightly    remdesivir IVPB  100 mg Intravenous Q24H       PRN Meds:ketorolac, sodium chloride flush, promethazine **OR** ondansetron, polyethylene glycol, acetaminophen **OR** acetaminophen, ibuprofen, albuterol-ipratropium    Radiology  Cta Chest W Wo  (pe Study)    Result Date: 2/1/2021  HISTORY: STEWART OLSEN is a Female of 38 years age. Evaluate for pulmonary embolism. COMMENTS:  no appetite, dehydrated, shortness of breath COMPARISON: None TECHNIQUE: Thin spiral chest CT was performed per pulmonary embolism protocol, following uneventful administration of intravenous contrast. Amount of intravenous contrast: 100 mL of Isovue-370. MIP images are included. FINDINGS:  There is no intraluminal filling defect identified within the central and proximal segmental pulmonary arteries to suggest pulmonary embolism. The thoracic aorta shows no evidence for dissection. The heart is mildly enlarged. Patchy airspace disease with groundglass opacities is seen throughout both lungs. There is a peripheral predominance. Consolidation is seen in both bases. Trace pleural fluid is seen.  The central airways and visualized portion of the esophagus are normal. A slight dextroscoliosis is seen in the upper thoracic spine. There is increased kyphosis. Limited survey views of the upper abdomen show that the liver is increased in attenuation. .     1.  No CT evidence of pulmonary embolism in the primary or secondary branches of the pulmonary arteries. 2. Bilateral airspace disease is seen on this examination consistent with Covid 19. All CT scans at this facility use dose modulation, iterative reconstruction, and/or weight based dosing when appropriate to reduce radiation dose to as low as reasonably achievable. PORTABLE  CHEST COMPARISON: January 25, 2021. HISTORY: d dimer TECHNIQUE: AP view FINDINGS: The lung volumes are diminished. Opacities are seen bilaterally, greater in the bases. The right costophrenic angle is blunted. The cardiac silhouette is within normal limits of size. The bony structures are grossly intact. IMPRESSION: Bilateral infiltrates     Xr Chest Portable    Result Date: 2/1/2021  HISTORY: STEWART Castro is a Female of 38 years age. Evaluate for pulmonary embolism. COMMENTS:  no appetite, dehydrated, shortness of breath COMPARISON: None TECHNIQUE: Thin spiral chest CT was performed per pulmonary embolism protocol, following uneventful administration of intravenous contrast. Amount of intravenous contrast: 100 mL of Isovue-370. MIP images are included. FINDINGS:  There is no intraluminal filling defect identified within the central and proximal segmental pulmonary arteries to suggest pulmonary embolism. The thoracic aorta shows no evidence for dissection. The heart is mildly enlarged. Patchy airspace disease with groundglass opacities is seen throughout both lungs. There is a peripheral predominance. Consolidation is seen in both bases. Trace pleural fluid is seen. The central airways and visualized portion of the esophagus are normal. A slight dextroscoliosis is seen in the upper thoracic spine. There is increased kyphosis.  Limited survey views of the upper abdomen show that the liver is increased in attenuation. .     1.  No CT evidence of pulmonary embolism in the primary or secondary branches of the pulmonary arteries. 2. Bilateral airspace disease is seen on this examination consistent with Covid 19. All CT scans at this facility use dose modulation, iterative reconstruction, and/or weight based dosing when appropriate to reduce radiation dose to as low as reasonably achievable. PORTABLE  CHEST COMPARISON: January 25, 2021. HISTORY: d dimer TECHNIQUE: AP view FINDINGS: The lung volumes are diminished. Opacities are seen bilaterally, greater in the bases. The right costophrenic angle is blunted. The cardiac silhouette is within normal limits of size. The bony structures are grossly intact. IMPRESSION: Bilateral infiltrates     Xr Chest Portable    Result Date: 1/25/2021  EXAMINATION: XR CHEST PORTABLE CLINICAL HISTORY: COUGH COMPARISONS: None available. FINDINGS: Osseous structures intact. Cardiopericardial silhouette normal. Pulmonary vasculature normal. Lungs clear. NO ACUTE CARDIOPULMONARY DISEASE.             IMPRESSION AND SUGGESTION:  Patient is at risk due to   · Acute hypoxic respiratory failure  · COVID-19 pneumonia  · Obesity    Recommendation  · Continue dexamethasone to complete 10 days of treatment  · O2 to keep sat 92 to 95%  · Prone position as tolerated  · Remdesivir for 5 days  · Watch volume status and avoid overload         Electronically signed by [facility] MD Duran,  FCCP   on 2/3/2021 at 2:08 PM

## 2021-02-03 NOTE — PROGRESS NOTES
Am assessment completed and charted; am med's given per mar. Patient c/o her chest having a burning sensation when she is breathing. Ibuprofen given to see if this will help. Attempted to call patient mother, Mann Hoover [phone] for an update. Left message asking her to call me back. 1037 am: Dr. Aditya Wills on unit and updated on patient condition. He went in room to see patient. 1400: Offered patient Toradol that Dr. Aditya Wills ordered several times; patient states she does not need it at this time. Attempted to call patient mother, no answer.

## 2021-02-03 NOTE — PROGRESS NOTES
Shift assessment complete. Pt is AxoX4. VS are stable. Sat 92-94% on 13L high flow. Lungs are diminished. Pt was sweaty, but afebrile. She did have a lot of layers on so I removed one of her blankets. meds given per mar. Denies any needs at this time. Call light within reach. Will continue to monitor. Electronically signed by Kirti Alford RN on 2/2/2021 at 11:18 PM    0600 pt slept through the night. VSD are stable. Up independently to the bedside commode. Call light within reach. Will continue to monitor.      Electronically signed by Kirti Alford RN on 2/3/2021 at 6:16 AM

## 2021-02-03 NOTE — PROGRESS NOTES
Hospitalist Daily Progress Note  Name: Demar Zimmer  Age: 38 y.o. Gender: female  CodeStatus: Full Code  Allergies: Latex  Penicillins  Phenobarbital  Adhesive Tape    Chief Complaint:No chief complaint on file. Primary Care Provider: ARLYN Abbott CNP  InpatientTreatment Team: Treatment Team: Attending Provider: Alejandra Bernardo DO; Utilization Reviewer: Tessa De Leon RN; Consulting Physician: Marisabel Cruz MD; Registered Nurse: Jerrye Schilder, RN; : Zaki Woods RN; Registered Nurse: Leandra Gallagher RN  Admission Date: 2/1/2021      Subjective: Patient seen and evaluated bedside. Patient remains on high FiO2, 12 L currently. Has a persistent dyspnea both at rest and with even minimal exertion. She has pleuritic type pain which is midsternal but relatively well controlled Tylenol and ibuprofen. Coagulopathy and inflammatory markers are trending down. Physical Exam  Constitutional:       Appearance: Normal appearance. She is ill-appearing, toxic-appearing and diaphoretic. HENT:      Head: Normocephalic and atraumatic. Nose: Nose normal.      Mouth/Throat:      Mouth: Mucous membranes are moist.      Pharynx: Oropharynx is clear. Eyes:      Conjunctiva/sclera: Conjunctivae normal.      Pupils: Pupils are equal, round, and reactive to light. Cardiovascular:      Rate and Rhythm: Normal rate. Heart sounds: No murmur. No friction rub. No gallop. Pulmonary:      Breath sounds: Rales present. No wheezing or rhonchi. Abdominal:      General: There is no distension. Tenderness: There is no abdominal tenderness. There is no guarding. Musculoskeletal: Normal range of motion. Right lower leg: No edema. Left lower leg: No edema. Neurological:      General: No focal deficit present. Mental Status: She is alert and oriented to person, place, and time. Psychiatric:         Mood and Affect: Mood normal.         Thought Content:  Thought content 38 years age. Evaluate for pulmonary embolism. COMMENTS:  no appetite, dehydrated, shortness of breath       COMPARISON: None    TECHNIQUE:   Thin spiral chest CT was performed per pulmonary embolism protocol, following uneventful administration of intravenous contrast.   Amount of intravenous contrast: 100 mL of Isovue-370. MIP images are included. FINDINGS:    There is no intraluminal filling defect identified within the central and proximal segmental pulmonary arteries to suggest pulmonary embolism. The thoracic aorta shows no evidence for dissection. The heart is mildly enlarged. Patchy airspace disease with groundglass opacities is seen throughout both lungs. There is a peripheral predominance. Consolidation is seen in both bases. Trace pleural fluid is seen. The central airways and visualized portion of the esophagus are   normal.  A slight dextroscoliosis is seen in the upper thoracic spine. There is increased kyphosis. Limited survey views of the upper abdomen show that the liver is increased in attenuation. .      Impression 1. No CT evidence of pulmonary embolism in the primary or secondary branches of the pulmonary arteries. 2. Bilateral airspace disease is seen on this examination consistent with Covid 19. All CT scans at this facility use dose modulation, iterative reconstruction, and/or weight based dosing when appropriate to reduce radiation dose to as low as reasonably achievable. PORTABLE  CHEST    COMPARISON: January 25, 2021. HISTORY: d dimer     TECHNIQUE: AP view      FINDINGS:  The lung volumes are diminished. Opacities are seen bilaterally, greater in the bases. The right costophrenic angle is blunted. The cardiac silhouette is within normal limits of size. The bony structures are grossly intact. IMPRESSION: Bilateral infiltrates         Echo No results found for this or any previous visit.           Assessment/Plan:    Active Hospital Problems    Diagnosis Date Noted    COVID-19 [U07.1] 02/01/2021     1. Acute Hypoxic Respiratory Failure 2/2 COVID 19 PNA: decadron daily x10 doses. Remdisovir. Mucinex, Combivent. Pulmonary consult  2. Epigastric pain: early repolarization in the EKG, cannot rule out pericarditis given symptoms. Limited echo ordered. Consult to cardiology. 3. Hypo-thyroid disease continue Synthroid  4. DVT PPX lovenox 30 BID per covid protocol. Additional work up or/and treatment plan may be added today or then after based on clinical progression. I am managing a portion of pt care. Some medical issues are handled byother specialists. Additional work up and treatment should be done in out pt setting by pt PCP and other out pt providers. In addition to examining and evaluating pt, I spent additional time explaining care, normaland abnormal findings, and treatment plan. All of pt questions were answered. Counseling, diet and education were provided. Case will be discussed with nursing staff when appropriate. Family will be updated if and whenappropriate.       Electronically signed by Trish Ennis DO on 2/3/2021 at 1:16 PM

## 2021-02-04 LAB
ALBUMIN SERPL-MCNC: 3.4 G/DL (ref 3.5–4.6)
ALP BLD-CCNC: 47 U/L (ref 40–130)
ALT SERPL-CCNC: 32 U/L (ref 0–33)
ANION GAP SERPL CALCULATED.3IONS-SCNC: 10 MEQ/L (ref 9–15)
AST SERPL-CCNC: 31 U/L (ref 0–35)
BASOPHILS ABSOLUTE: 0 K/UL (ref 0–0.2)
BASOPHILS RELATIVE PERCENT: 0.2 %
BILIRUB SERPL-MCNC: 0.5 MG/DL (ref 0.2–0.7)
BUN BLDV-MCNC: 13 MG/DL (ref 6–20)
C-REACTIVE PROTEIN: 22.6 MG/L (ref 0–5)
CALCIUM SERPL-MCNC: 8.7 MG/DL (ref 8.5–9.9)
CHLORIDE BLD-SCNC: 107 MEQ/L (ref 95–107)
CO2: 28 MEQ/L (ref 20–31)
CREAT SERPL-MCNC: 0.4 MG/DL (ref 0.5–0.9)
D DIMER: 0.54 MG/L FEU (ref 0–0.5)
EOSINOPHILS ABSOLUTE: 0 K/UL (ref 0–0.7)
EOSINOPHILS RELATIVE PERCENT: 0 %
GFR AFRICAN AMERICAN: >60
GFR NON-AFRICAN AMERICAN: >60
GLOBULIN: 2.7 G/DL (ref 2.3–3.5)
GLUCOSE BLD-MCNC: 95 MG/DL (ref 70–99)
HCT VFR BLD CALC: 38.2 % (ref 37–47)
HEMOGLOBIN: 12.6 G/DL (ref 12–16)
LYMPHOCYTES ABSOLUTE: 1 K/UL (ref 1–4.8)
LYMPHOCYTES RELATIVE PERCENT: 18.1 %
MCH RBC QN AUTO: 30.2 PG (ref 27–31.3)
MCHC RBC AUTO-ENTMCNC: 32.9 % (ref 33–37)
MCV RBC AUTO: 91.8 FL (ref 82–100)
MONOCYTES ABSOLUTE: 0.5 K/UL (ref 0.2–0.8)
MONOCYTES RELATIVE PERCENT: 9 %
NEUTROPHILS ABSOLUTE: 3.9 K/UL (ref 1.4–6.5)
NEUTROPHILS RELATIVE PERCENT: 72.7 %
PDW BLD-RTO: 13.3 % (ref 11.5–14.5)
PLATELET # BLD: 305 K/UL (ref 130–400)
POTASSIUM REFLEX MAGNESIUM: 4.1 MEQ/L (ref 3.4–4.9)
RBC # BLD: 4.16 M/UL (ref 4.2–5.4)
SODIUM BLD-SCNC: 145 MEQ/L (ref 135–144)
TOTAL PROTEIN: 6.1 G/DL (ref 6.3–8)
WBC # BLD: 5.4 K/UL (ref 4.8–10.8)

## 2021-02-04 PROCEDURE — 2500000003 HC RX 250 WO HCPCS: Performed by: INTERNAL MEDICINE

## 2021-02-04 PROCEDURE — 2060000000 HC ICU INTERMEDIATE R&B

## 2021-02-04 PROCEDURE — 6360000002 HC RX W HCPCS: Performed by: INTERNAL MEDICINE

## 2021-02-04 PROCEDURE — 99232 SBSQ HOSP IP/OBS MODERATE 35: CPT | Performed by: INTERNAL MEDICINE

## 2021-02-04 PROCEDURE — 86140 C-REACTIVE PROTEIN: CPT

## 2021-02-04 PROCEDURE — 85025 COMPLETE CBC W/AUTO DIFF WBC: CPT

## 2021-02-04 PROCEDURE — 93010 ELECTROCARDIOGRAM REPORT: CPT | Performed by: INTERNAL MEDICINE

## 2021-02-04 PROCEDURE — 2700000000 HC OXYGEN THERAPY PER DAY

## 2021-02-04 PROCEDURE — 36415 COLL VENOUS BLD VENIPUNCTURE: CPT

## 2021-02-04 PROCEDURE — 80053 COMPREHEN METABOLIC PANEL: CPT

## 2021-02-04 PROCEDURE — 6370000000 HC RX 637 (ALT 250 FOR IP): Performed by: INTERNAL MEDICINE

## 2021-02-04 PROCEDURE — 93308 TTE F-UP OR LMTD: CPT

## 2021-02-04 PROCEDURE — 2580000003 HC RX 258: Performed by: INTERNAL MEDICINE

## 2021-02-04 PROCEDURE — 85379 FIBRIN DEGRADATION QUANT: CPT

## 2021-02-04 RX ADMIN — ATORVASTATIN CALCIUM 20 MG: 20 TABLET, FILM COATED ORAL at 20:24

## 2021-02-04 RX ADMIN — LEVOTHYROXINE SODIUM 200 MCG: 100 TABLET ORAL at 08:38

## 2021-02-04 RX ADMIN — ENOXAPARIN SODIUM 30 MG: 30 INJECTION SUBCUTANEOUS at 08:38

## 2021-02-04 RX ADMIN — ENOXAPARIN SODIUM 30 MG: 30 INJECTION SUBCUTANEOUS at 20:24

## 2021-02-04 RX ADMIN — KETOROLAC TROMETHAMINE 15 MG: 15 INJECTION, SOLUTION INTRAMUSCULAR; INTRAVENOUS at 08:48

## 2021-02-04 RX ADMIN — REMDESIVIR 100 MG: 100 INJECTION, POWDER, LYOPHILIZED, FOR SOLUTION INTRAVENOUS at 14:57

## 2021-02-04 RX ADMIN — GUAIFENESIN 600 MG: 600 TABLET ORAL at 08:37

## 2021-02-04 RX ADMIN — GUAIFENESIN 600 MG: 600 TABLET ORAL at 20:24

## 2021-02-04 RX ADMIN — Medication 10 ML: at 08:38

## 2021-02-04 RX ADMIN — DEXAMETHASONE 6 MG: 2 TABLET ORAL at 08:37

## 2021-02-04 RX ADMIN — Medication 10 ML: at 20:28

## 2021-02-04 ASSESSMENT — ENCOUNTER SYMPTOMS
CHEST TIGHTNESS: 0
BLOOD IN STOOL: 0
NAUSEA: 0
VOICE CHANGE: 0
SHORTNESS OF BREATH: 0
APNEA: 0
VOMITING: 0
FACIAL SWELLING: 0
ANAL BLEEDING: 0
WHEEZING: 0
ABDOMINAL DISTENTION: 0
COLOR CHANGE: 0
TROUBLE SWALLOWING: 0

## 2021-02-04 ASSESSMENT — PAIN DESCRIPTION - LOCATION: LOCATION: CHEST

## 2021-02-04 ASSESSMENT — PAIN DESCRIPTION - PAIN TYPE: TYPE: ACUTE PAIN

## 2021-02-04 ASSESSMENT — PAIN DESCRIPTION - DESCRIPTORS: DESCRIPTORS: BURNING

## 2021-02-04 NOTE — PROGRESS NOTES
INPATIENT PROGRESS NOTES    PATIENT NAME: Edi June  MRN: [id number]  SERVICE DATE:  February 4, 2021   SERVICE TIME:  12:41 PM      PRIMARY SERVICE: Pulmonary Disease    CHIEF COMPLAINTS: COVID-19 infection with acute hypoxic respiratory failure    INTERVAL HPI: Patient seen and examined at bedside, Interval Notes, orders reviewed. Nursing notes noted    Feeling much improved, shortness of breath improved, no chest pain, minimal coughing, she is down to 4 L O2 saturation 99%, and no fever. No nausea no vomiting. Review of system:     GI Abdominal pain No  Skin Rash No    Social History     Tobacco Use    Smoking status: Never Smoker   Substance Use Topics    Alcohol use: Never     Frequency: Never     Binge frequency: Never         Problem Relation Age of Onset    Coronary Art Dis Paternal Grandfather          OBJECTIVE    Body mass index is 44.73 kg/m². PHYSICAL EXAM:  Vitals:  /62   Pulse 55   Temp 97.3 °F (36.3 °C) (Oral)   Resp 18   Wt 260 lb 9.3 oz (118.2 kg)   SpO2 99%   BMI 44.73 kg/m²     General: alert, cooperative, no distress  Head: normocephalic, atraumatic  Eyes:No gross abnormalities. ENT:  MMM no lesions  Neck:  supple and no masses  Chest : Good air movement, minimal rales bilateral, no wheezing, nontender, tympanic  Heart[de-identified] Heart sounds are normal.  Regular rate and rhythm without murmur, gallop or rub. ABD:  symmetric, soft, non-tender, no guarding or rebound  Musculoskeletal : no cyanosis, no clubbing and no edema  Neuro:  Grossly normal  Skin: No rashes or nodules noted.   Lymph node:  no cervical nodes  Urology: No [place]   Psychiatric: appropriate    DATA:   Recent Labs     02/03/21  0643 02/04/21  0859   WBC 6.3 5.4   HGB 11.8* 12.6   HCT 35.5* 38.2   MCV 92.4 91.8    305     Recent Labs     02/03/21  0643 02/04/21  0859    145*   K 4.1 4.1    107   CO2 25 28   BUN 13 13   CREATININE 0.52 0.40*   GLUCOSE 98 95   CALCIUM 8.2* 8.7   PROT 6.2* 6.1* LABALBU 3.1* 3.4*   BILITOT 0.4 0.5   ALKPHOS 44 47   AST 53* 31   ALT 30 32   LABGLOM >60.0 >60.0   GFRAA >60.0 >60.0   GLOB 3.1 2.7       MV Settings:          No results for input(s): PHART, QDC4GHC, PO2ART, WIG6HYP, BEART, F2MKLPVM in the last 72 hours. O2 Device: High flow nasal cannula  O2 Flow Rate (L/min): 4 L/min    DIET CARDIAC; Safety Tray; Safety Tray (Disposables)     MEDICATIONS during current hospitalization:    Continuous Infusions:    Scheduled Meds:   sodium chloride flush  10 mL Intravenous 2 times per day    enoxaparin  30 mg Subcutaneous BID    dexamethasone  6 mg Oral Daily    guaiFENesin  600 mg Oral BID    levothyroxine  200 mcg Oral Daily    atorvastatin  20 mg Oral Nightly    remdesivir IVPB  100 mg Intravenous Q24H       PRN Meds:ketorolac, sodium chloride flush, promethazine **OR** ondansetron, polyethylene glycol, acetaminophen **OR** acetaminophen, ibuprofen, albuterol-ipratropium    Radiology  Cta Chest W Wo  (pe Study)    Result Date: 2/1/2021  HISTORY: STEWART OLSEN is a Female of 38 years age. Evaluate for pulmonary embolism. COMMENTS:  no appetite, dehydrated, shortness of breath COMPARISON: None TECHNIQUE: Thin spiral chest CT was performed per pulmonary embolism protocol, following uneventful administration of intravenous contrast. Amount of intravenous contrast: 100 mL of Isovue-370. MIP images are included. FINDINGS:  There is no intraluminal filling defect identified within the central and proximal segmental pulmonary arteries to suggest pulmonary embolism. The thoracic aorta shows no evidence for dissection. The heart is mildly enlarged. Patchy airspace disease with groundglass opacities is seen throughout both lungs. There is a peripheral predominance. Consolidation is seen in both bases. Trace pleural fluid is seen. The central airways and visualized portion of the esophagus are normal. A slight dextroscoliosis is seen in the upper thoracic spine.  There is increased kyphosis. Limited survey views of the upper abdomen show that the liver is increased in attenuation. .     1.  No CT evidence of pulmonary embolism in the primary or secondary branches of the pulmonary arteries. 2. Bilateral airspace disease is seen on this examination consistent with Covid 19. All CT scans at this facility use dose modulation, iterative reconstruction, and/or weight based dosing when appropriate to reduce radiation dose to as low as reasonably achievable. PORTABLE  CHEST COMPARISON: January 25, 2021. HISTORY: d dimer TECHNIQUE: AP view FINDINGS: The lung volumes are diminished. Opacities are seen bilaterally, greater in the bases. The right costophrenic angle is blunted. The cardiac silhouette is within normal limits of size. The bony structures are grossly intact. IMPRESSION: Bilateral infiltrates     Xr Chest Portable    Result Date: 2/1/2021  HISTORY: STEWART Castro is a Female of 38 years age. Evaluate for pulmonary embolism. COMMENTS:  no appetite, dehydrated, shortness of breath COMPARISON: None TECHNIQUE: Thin spiral chest CT was performed per pulmonary embolism protocol, following uneventful administration of intravenous contrast. Amount of intravenous contrast: 100 mL of Isovue-370. MIP images are included. FINDINGS:  There is no intraluminal filling defect identified within the central and proximal segmental pulmonary arteries to suggest pulmonary embolism. The thoracic aorta shows no evidence for dissection. The heart is mildly enlarged. Patchy airspace disease with groundglass opacities is seen throughout both lungs. There is a peripheral predominance. Consolidation is seen in both bases. Trace pleural fluid is seen. The central airways and visualized portion of the esophagus are normal. A slight dextroscoliosis is seen in the upper thoracic spine. There is increased kyphosis. Limited survey views of the upper abdomen show that the liver is increased in attenuation. Vonzella Goodpasture      1. No CT evidence of pulmonary embolism in the primary or secondary branches of the pulmonary arteries. 2. Bilateral airspace disease is seen on this examination consistent with Covid 19. All CT scans at this facility use dose modulation, iterative reconstruction, and/or weight based dosing when appropriate to reduce radiation dose to as low as reasonably achievable. PORTABLE  CHEST COMPARISON: January 25, 2021. HISTORY: d dimer TECHNIQUE: AP view FINDINGS: The lung volumes are diminished. Opacities are seen bilaterally, greater in the bases. The right costophrenic angle is blunted. The cardiac silhouette is within normal limits of size. The bony structures are grossly intact. IMPRESSION: Bilateral infiltrates     Xr Chest Portable    Result Date: 1/25/2021  EXAMINATION: XR CHEST PORTABLE CLINICAL HISTORY: COUGH COMPARISONS: None available. FINDINGS: Osseous structures intact. Cardiopericardial silhouette normal. Pulmonary vasculature normal. Lungs clear. NO ACUTE CARDIOPULMONARY DISEASE.             IMPRESSION AND SUGGESTION:  Patient is at risk due to   · Acute hypoxic respiratory failure  · COVID-19 pneumonia  · Obesity    Recommendation  · Continue dexamethasone to complete 10 days of treatment  · O2 to keep sat 92 to 95%   · Encourage prone position  · Remdesivir for 5 days  · Watch volume status and avoid overload    Patient is improving, pulmonary will sign off, please call for any question or concern     Electronically signed by Guadalupe De La Fuente MD,  FCCP   on 2/4/2021 at 12:41 PM

## 2021-02-04 NOTE — PROGRESS NOTES
Progress Note  Patient: Devin Jewell  Unit/Bed: B632/T273-32  YOB: 1985  MRN: [id number]  Acct: [de-identified]   Admitting Diagnosis: JEFFEDARCIE-50 [U07.1]  Date:  2/1/2021  Hospital Day: 3    Chief Complaint:  Covid positive abnormal EKG    Subjective  Talk to the patient on the phine. She feels great. Chest pain is almost gone. Dr. Augustine Ayers. EKG changes are too localized to be compatible with pericarditis. In any way  the symptoms have resolved. Review of Systems:   Review of Systems   Constitutional: Negative for activity change, appetite change, diaphoresis, fatigue and unexpected weight change. HENT: Negative for facial swelling, nosebleeds, trouble swallowing and voice change. Respiratory: Negative for apnea, chest tightness, shortness of breath and wheezing. Cardiovascular: Negative for chest pain, palpitations and leg swelling. Gastrointestinal: Negative for abdominal distention, anal bleeding, blood in stool, nausea and vomiting. Genitourinary: Negative for decreased urine volume and dysuria. Musculoskeletal: Negative for gait problem and myalgias. Skin: Negative for color change, pallor, rash and wound. Neurological: Negative for dizziness, syncope, facial asymmetry, weakness, light-headedness, numbness and headaches. Hematological: Does not bruise/bleed easily. Psychiatric/Behavioral: Negative for agitation, behavioral problems, confusion, hallucinations and suicidal ideas. The patient is not nervous/anxious. All other systems reviewed and are negative.         Physical Examination:    /74   Pulse 53   Temp 97.5 °F (36.4 °C) (Oral)   Resp 18   Wt 260 lb 9.3 oz (118.2 kg)   SpO2 99%   BMI 44.73 kg/m²    Physical Exam  Deferred  LABS:  CBC:   Lab Results   Component Value Date    WBC 5.4 02/04/2021    RBC 4.16 02/04/2021    HGB 12.6 02/04/2021    HCT 38.2 02/04/2021    MCV 91.8 02/04/2021    MCH 30.2 02/04/2021    MCHC 32.9 02/04/2021    RDW 13.3 02/04/2021  02/04/2021    MPV 8.5 08/27/2015     CBC with Differential:   Lab Results   Component Value Date    WBC 5.4 02/04/2021    RBC 4.16 02/04/2021    HGB 12.6 02/04/2021    HCT 38.2 02/04/2021     02/04/2021    MCV 91.8 02/04/2021    MCH 30.2 02/04/2021    MCHC 32.9 02/04/2021    RDW 13.3 02/04/2021    BANDSPCT 2 02/02/2021    LYMPHOPCT 18.1 02/04/2021    MONOPCT 9.0 02/04/2021    BASOPCT 0.2 02/04/2021    MONOSABS 0.5 02/04/2021    LYMPHSABS 1.0 02/04/2021    EOSABS 0.0 02/04/2021    BASOSABS 0.0 02/04/2021     CMP:    Lab Results   Component Value Date     02/04/2021    K 4.1 02/04/2021     02/04/2021    CO2 28 02/04/2021    BUN 13 02/04/2021    CREATININE 0.40 02/04/2021    GFRAA >60.0 02/04/2021    LABGLOM >60.0 02/04/2021    GLUCOSE 95 02/04/2021    PROT 6.1 02/04/2021    LABALBU 3.4 02/04/2021    CALCIUM 8.7 02/04/2021    BILITOT 0.5 02/04/2021    ALKPHOS 47 02/04/2021    AST 31 02/04/2021    ALT 32 02/04/2021     BMP:    Lab Results   Component Value Date     02/04/2021    K 4.1 02/04/2021     02/04/2021    CO2 28 02/04/2021    BUN 13 02/04/2021    LABALBU 3.4 02/04/2021    CREATININE 0.40 02/04/2021    CALCIUM 8.7 02/04/2021    GFRAA >60.0 02/04/2021    LABGLOM >60.0 02/04/2021    GLUCOSE 95 02/04/2021     Magnesium:  No results found for: MG  Troponin:    Lab Results   Component Value Date    TROPONINI <0.010 02/02/2021       Radiology:  No results found. EKG:   Assessment:    Active Hospital Problems    Diagnosis Date Noted    Abnormal EKG [R94.31]     COVID-19 [U07.1] 02/01/2021           Plan:  1.  Awaiting results of limited echo  Electronically signed by Dameon Baig MD on 2/4/2021 at 3:38 PM

## 2021-02-04 NOTE — PROGRESS NOTES
Hospitalist Daily Progress Note  Name: Devin Jewell  Age: 38 y.o. Gender: female  CodeStatus: Full Code  Allergies: Latex  Penicillins  Phenobarbital  Adhesive Tape    Chief Complaint:No chief complaint on file. Primary Care Provider: ARLYN Salguero CNP  InpatientTreatment Team: Treatment Team: Attending Provider: Marilia Sims DO; Utilization Reviewer: Nirav Alaniz RN; Consulting Physician: Alecia Rios MD; Registered Nurse: Flori Marie RN; : Tristian Gomez RN; Patient Care Tech: Keya Celaya  Admission Date: 2/1/2021      Subjective: Patient seen and evaluated bedside. Medical improvement today saturating well on room air conversationally without any dyspnea. Patient ambulated to the toilet and back today. Chest pain improved with Toradol. Inflammatory markers trending down    Physical Exam  Constitutional:       Appearance: Normal appearance. She is ill-appearing, toxic-appearing and diaphoretic. HENT:      Head: Normocephalic and atraumatic. Nose: Nose normal.      Mouth/Throat:      Mouth: Mucous membranes are moist.      Pharynx: Oropharynx is clear. Eyes:      Conjunctiva/sclera: Conjunctivae normal.      Pupils: Pupils are equal, round, and reactive to light. Cardiovascular:      Rate and Rhythm: Normal rate. Heart sounds: No murmur. No friction rub. No gallop. Pulmonary:      Breath sounds: Rales present. No wheezing or rhonchi. Abdominal:      General: There is no distension. Tenderness: There is no abdominal tenderness. There is no guarding. Musculoskeletal: Normal range of motion. Right lower leg: No edema. Left lower leg: No edema. Neurological:      General: No focal deficit present. Mental Status: She is alert and oriented to person, place, and time. Psychiatric:         Mood and Affect: Mood normal.         Thought Content:  Thought content normal.         Judgment: Judgment normal.         Review of Systems  14 point ROS reviewed negative except for as above  Medications:  Reviewed    Infusion Medications:   Scheduled Medications:    sodium chloride flush  10 mL Intravenous 2 times per day    enoxaparin  30 mg Subcutaneous BID    dexamethasone  6 mg Oral Daily    guaiFENesin  600 mg Oral BID    levothyroxine  200 mcg Oral Daily    atorvastatin  20 mg Oral Nightly    remdesivir IVPB  100 mg Intravenous Q24H     PRN Meds: ketorolac, sodium chloride flush, promethazine **OR** ondansetron, polyethylene glycol, acetaminophen **OR** acetaminophen, ibuprofen, albuterol-ipratropium    Labs:   Recent Labs     02/02/21  0639 02/03/21  0643 02/04/21  0859   WBC 9.2 6.3 5.4   HGB 13.5 11.8* 12.6   HCT 40.6 35.5* 38.2    275 305     Recent Labs     02/02/21  0639 02/03/21  0643 02/04/21  0859    140 145*   K 4.1 4.1 4.1    104 107   CO2 20 25 28   BUN 12 13 13   CREATININE 0.54 0.52 0.40*   CALCIUM 8.7 8.2* 8.7     Recent Labs     02/02/21  0639 02/03/21  0643 02/04/21  0859   AST 26 53* 31   ALT 10 30 32   BILITOT 0.4 0.4 0.5   ALKPHOS 49 44 47     Recent Labs     02/02/21  0639   INR 1.0     Recent Labs     02/02/21  0010 02/02/21  0639 02/02/21  1409   TROPONINI <0.010 <0.010 <0.010       Urinalysis:   Lab Results   Component Value Date    NITRU Negative 05/19/2020    WBCUA  05/19/2020    BACTERIA MODERATE 05/19/2020    RBCUA 10-20 05/19/2020    BLOODU Large 05/19/2020    SPECGRAV >=1.030 05/19/2020    GLUCOSEU Negative 05/19/2020       Radiology:   Most recent    Chest CT      WITH CONTRAST:No results found for this or any previous visit. WITHOUT CONTRAST: No results found for this or any previous visit. CXR      2-view: No results found for this or any previous visit. Portable:   Results for orders placed during the hospital encounter of 02/01/21   XR CHEST PORTABLE    Narrative HISTORY: STEWART Garcia is a Female of 38 years age. Evaluate for pulmonary embolism.     COMMENTS: no appetite, dehydrated, shortness of breath       COMPARISON: None    TECHNIQUE:   Thin spiral chest CT was performed per pulmonary embolism protocol, following uneventful administration of intravenous contrast.   Amount of intravenous contrast: 100 mL of Isovue-370. MIP images are included. FINDINGS:    There is no intraluminal filling defect identified within the central and proximal segmental pulmonary arteries to suggest pulmonary embolism. The thoracic aorta shows no evidence for dissection. The heart is mildly enlarged. Patchy airspace disease with groundglass opacities is seen throughout both lungs. There is a peripheral predominance. Consolidation is seen in both bases. Trace pleural fluid is seen. The central airways and visualized portion of the esophagus are   normal.  A slight dextroscoliosis is seen in the upper thoracic spine. There is increased kyphosis. Limited survey views of the upper abdomen show that the liver is increased in attenuation. .      Impression 1. No CT evidence of pulmonary embolism in the primary or secondary branches of the pulmonary arteries. 2. Bilateral airspace disease is seen on this examination consistent with Covid 19. All CT scans at this facility use dose modulation, iterative reconstruction, and/or weight based dosing when appropriate to reduce radiation dose to as low as reasonably achievable. PORTABLE  CHEST    COMPARISON: January 25, 2021. HISTORY: d dimer     TECHNIQUE: AP view      FINDINGS:  The lung volumes are diminished. Opacities are seen bilaterally, greater in the bases. The right costophrenic angle is blunted. The cardiac silhouette is within normal limits of size. The bony structures are grossly intact. IMPRESSION: Bilateral infiltrates         Echo No results found for this or any previous visit.           Assessment/Plan:    Active Hospital Problems    Diagnosis Date Noted    Abnormal EKG [R94.31]     COVID-19 [U07.1] 02/01/2021     1. Acute Hypoxic Respiratory Failure 2/2 COVID 19 PNA: decadron daily x10 doses. Remdisovir. Mucinex, Combivent. Pulmonary consult  2. Epigastric pain: early repolarization in the EKG, limited echo pending. Cardiology consulted- low concern for pericarditis  3. Hypo-thyroid disease continue Synthroid  4. DVT PPX lovenox 30 BID per covid protocol. Additional work up or/and treatment plan may be added today or then after based on clinical progression. I am managing a portion of pt care. Some medical issues are handled byother specialists. Additional work up and treatment should be done in out pt setting by pt PCP and other out pt providers. In addition to examining and evaluating pt, I spent additional time explaining care, normaland abnormal findings, and treatment plan. All of pt questions were answered. Counseling, diet and education were provided. Case will be discussed with nursing staff when appropriate. Family will be updated if and whenappropriate.       Electronically signed by Marilia Sims DO on 2/4/2021 at 4:09 PM

## 2021-02-05 VITALS
OXYGEN SATURATION: 100 % | WEIGHT: 260.58 LBS | DIASTOLIC BLOOD PRESSURE: 70 MMHG | SYSTOLIC BLOOD PRESSURE: 108 MMHG | BODY MASS INDEX: 44.73 KG/M2 | TEMPERATURE: 97.7 F | HEART RATE: 58 BPM | RESPIRATION RATE: 20 BRPM

## 2021-02-05 PROCEDURE — 6370000000 HC RX 637 (ALT 250 FOR IP): Performed by: INTERNAL MEDICINE

## 2021-02-05 PROCEDURE — 2700000000 HC OXYGEN THERAPY PER DAY

## 2021-02-05 PROCEDURE — 2580000003 HC RX 258: Performed by: INTERNAL MEDICINE

## 2021-02-05 PROCEDURE — 6360000002 HC RX W HCPCS: Performed by: INTERNAL MEDICINE

## 2021-02-05 RX ORDER — ALBUTEROL SULFATE 90 UG/1
2 AEROSOL, METERED RESPIRATORY (INHALATION) 4 TIMES DAILY PRN
Qty: 1 INHALER | Refills: 5 | Status: SHIPPED | OUTPATIENT
Start: 2021-02-05

## 2021-02-05 RX ORDER — DEXAMETHASONE 6 MG/1
6 TABLET ORAL
Qty: 5 TABLET | Refills: 0 | Status: SHIPPED | OUTPATIENT
Start: 2021-02-05 | End: 2021-02-10

## 2021-02-05 RX ORDER — GUAIFENESIN 600 MG/1
600 TABLET, EXTENDED RELEASE ORAL 2 TIMES DAILY
Qty: 28 TABLET | Refills: 0 | Status: SHIPPED | OUTPATIENT
Start: 2021-02-05 | End: 2021-02-19

## 2021-02-05 RX ORDER — ATORVASTATIN CALCIUM 20 MG/1
20 TABLET, FILM COATED ORAL NIGHTLY
Qty: 30 TABLET | Refills: 3 | Status: SHIPPED | OUTPATIENT
Start: 2021-02-05

## 2021-02-05 RX ADMIN — KETOROLAC TROMETHAMINE 15 MG: 15 INJECTION, SOLUTION INTRAMUSCULAR; INTRAVENOUS at 06:59

## 2021-02-05 RX ADMIN — GUAIFENESIN 600 MG: 600 TABLET ORAL at 09:24

## 2021-02-05 RX ADMIN — LEVOTHYROXINE SODIUM 200 MCG: 100 TABLET ORAL at 06:37

## 2021-02-05 RX ADMIN — Medication 10 ML: at 09:26

## 2021-02-05 RX ADMIN — ENOXAPARIN SODIUM 30 MG: 30 INJECTION SUBCUTANEOUS at 09:23

## 2021-02-05 RX ADMIN — DEXAMETHASONE 6 MG: 2 TABLET ORAL at 09:24

## 2021-02-05 NOTE — CARE COORDINATION
Spoke with Dr. Manas Lezama pt is to be dcd home today on 02. Currently on 4l n/c. Order [facility]ed to [facility]. Tank given to nursing for d/c with pt inst to call them enroute home to have more tanks delivered. Pt has pulse oximeter at home and per nursing will monitor and record readings and present to PCP on follow-up. [facility]  [facility] is up independently return home with boyfriend.  Electronically signed by Candy Bonds RN on 2/5/2021 at 1:46 PM

## 2021-02-06 ENCOUNTER — CARE COORDINATION (OUTPATIENT)
Dept: CASE MANAGEMENT | Age: 36
End: 2021-02-06

## 2021-02-06 NOTE — DISCHARGE SUMMARY
Hospital Medicine Discharge Summary    Arash Barton  :  1985  MRN:  [id number]    Admit date:  2021  Discharge date:  2021    Admitting Physician:  Ritesh Morales DO  Primary Care Physician:  ARLYN Boo CNP      Discharge Diagnoses: Active Problems:    COVID-19    Abnormal EKG  Resolved Problems:    * No resolved hospital problems. *  acute hypoxic respiratory failure. covid 19. Concern for pericarditis. Chest pain. Hospital Course:   Arash Barton is a 38 y.o. female that was admitted and treated at [facility] for hypoxic respiratory failure due to COVID 19 PNA. [facility]. CTA done in ED did not show PE, but did show covid changes. Pt was started on decadron and remdisovir. She initially had a rapid decompensation, requiring 15L O2, however slowly improved. During admission she developed chest pain and had diffuse t wave inversions concerning for pericarditis. ECHO was ok. Cardio consulted and ruled out pericarditis. Pt will d/c on decadron taper, with albuterol MDI, she is encouraged to use NSAID and tylenol for any further chest pain and myalgias. Follow up with PCP as needed. Return to hospital if O2 saturation on pulse ox is less than 90 while on supplemental o2. Patient was seen by the following consultants while admitted to [facility]:   Consults:  [address] [place] [address] TO PULMONOLOGY  IP CONSULT TO CARDIOLOGY    Physical Exam:   General appearance: No apparent distress, appears stated age and cooperative. HEENT: Pupils equal, round, and reactive to light. Conjunctivae/corneas clear. Neck: Supple, with full range of motion. No jugular venous distention. Trachea midline. Respiratory:  Normal respiratory effort. Decreased breath sounds, no w/r/r  Cardiovascular: Regular rate and rhythm with normal S1/S2 without murmurs, rubs or gallops.   Abdomen: Soft, non-tender, non-distended with normal bowel sounds. Musculoskeletal: No clubbing, cyanosis or edema bilaterally. Full range of motion without deformity. Skin: Skin color, texture, turgor normal.  No rashes or lesions. Neuro: Non Focal. Symetrical motor and tone. Nl Comprehension, Alert,awake and oriented. Psychiatric: Alert and oriented, thought content appropriate, normal insight  Peripheral Pulses: +2 palpable, equal bilaterally    Significant Diagnostic Studies:  CTA chest  HISTORY: STEWART OLSEN is a Female of 38 years age. [facility] for pulmonary embolism.       COMMENTS:  no appetite, dehydrated, shortness of breath            COMPARISON: None       TECHNIQUE:    Thin spiral chest CT was performed per pulmonary embolism protocol, following uneventful administration of intravenous contrast.    Amount of intravenous contrast: 100 mL of Isovue-370. MIP images are included.       FINDINGS:     There is no intraluminal filling defect identified within the central and proximal segmental pulmonary arteries to suggest pulmonary embolism.  The thoracic aorta shows no evidence for dissection. The heart is mildly enlarged.       Patchy airspace disease with groundglass opacities is seen throughout both lungs. There is a peripheral predominance. Consolidation is seen in both bases. Trace pleural fluid is seen. The central airways and visualized portion of the esophagus are    normal.   A slight dextroscoliosis is seen in the upper thoracic spine. There is increased kyphosis. Limited survey views of the upper abdomen show that the liver is increased in attenuation. .           Impression   1.  No CT evidence of pulmonary embolism in the primary or secondary branches of the pulmonary arteries.       2. Bilateral airspace disease is seen on this examination consistent with Covid 19.           All CT scans at this facility use dose modulation, iterative reconstruction, and/or weight based dosing when appropriate to reduce radiation dose to as low as reasonably achievable.       PORTABLE  CHEST       COMPARISON: January 25, 2021.           HISTORY: d dimer        TECHNIQUE: AP view           FINDINGS:   The lung volumes are diminished. Opacities are seen bilaterally, greater in the bases. The right costophrenic angle is blunted. The cardiac silhouette is within normal limits of size. The bony structures are grossly intact.       IMPRESSION: Bilateral infiltrates     ECHO showing ef 60% with normal diastolic function. Discharge Medications:       Keyshawn Orozco   Home Medication Instructions ITF:[id number]    Printed on:02/05/21 2008   Medication Information                      albuterol sulfate HFA (VENTOLIN HFA) 108 (90 Base) MCG/ACT inhaler  Inhale 2 puffs into the lungs 4 times daily as needed for Wheezing             atorvastatin (LIPITOR) 20 MG tablet  Take 1 tablet by mouth nightly             budesonide-formoterol (SYMBICORT) 160-4.5 MCG/ACT AERO  Inhale 2 puffs into the lungs 2 times daily             dexamethasone (DECADRON) 6 MG tablet  Take 1 tablet by mouth daily (with breakfast) for 5 days             guaiFENesin (MUCINEX) 600 MG extended release tablet  Take 1 tablet by mouth 2 times daily for 14 days             ketorolac (TORADOL) 10 MG tablet  Take 1 tablet by mouth every 6 hours as needed for Pain             levothyroxine (SYNTHROID) 200 MCG tablet  Take 1/2 tab on Monday. Take 1 full tab all other days of the week. Patient take 5 days a week             SUMAtriptan (IMITREX) 25 MG tablet  Take 25 mg by mouth as needed                 Disposition:   Discharged to Home. Any C needs that were indicated and/or required as been addressed and set up by Social Work. Condition at discharge: Pt was medically stable at the time of discharge. Activity: activity as tolerated    Total time taken for discharging this patient: 40 minutes. Greater than 70% of time was spent focused exclusively on this patient.  Time was taken to review chart,

## 2021-02-08 ENCOUNTER — CARE COORDINATION (OUTPATIENT)
Dept: CARE COORDINATION | Age: 36
End: 2021-02-08

## 2021-02-08 NOTE — CARE COORDINATION
[facility] Transitions Initial Follow Up Call    Call within 2 business days of discharge: No    Patient: Sandy Rosario Patient : 1985   MRN: [id number]  Reason for Admission:Covid 19 (+), Acute Hypoxic Respiratory Failure. Discharge Date: 21 RARS: Readmission Risk Score: 8      Last Discharge [facility]       Complaint Diagnosis Description Type Department Provider    21   Admission (Discharged) JEF Pitts DO    21 Positive For Covid-19 Hypoxia . .. ED (TRANSFER) [address]  ED Kaelyn Nascimento DO           Spoke with: Patient    Facility: [facility]    Non-face-to-face services provided:  Obtained and reviewed discharge summary and/or continuity of care documents         Care Transitions 24 Hour Call    Schedule Follow Up Appointment with PCP: Declined  Do you have any ongoing symptoms?: Yes  Patient-reported symptoms: Cough, Fatigue  Do you have a copy of your discharge instructions?: Yes  Do you have all of your prescriptions and are they filled?: Yes  Have you been contacted by a [facility] Pharmacist?: No  Have you scheduled your follow up appointment?: No (Comment: Pt to schedule f/u VV with PCP)  Were you discharged with any Home Care or Post Acute Services: No  Do you have support at home?: Partner/Spouse/SO, Child  Do you feel like you have everything you need to keep you well at home?: Yes  Are you an active caregiver in your home?: No  Care Transitions Interventions  No Identified Needs         Follow Up  No future appointments. Gaby Cuenca LPN    Patient contacted regarding [id number] diagnosis\". Discussed COVID-19 related testing which was available at this time. Test results were positive. Patient informed of results, if available? Yes, test done 2021    Care Transition Nurse/ Ambulatory Care Manager contacted the patient by telephone to perform post discharge assessment.  Call within 2 business days of discharge: No. Verified name and  with patient as identifiers. Provided introduction to self, and explanation of the CTN/ACM role, and reason for call due to risk factors for infection and/or exposure to COVID-19. Symptoms reviewed with patient who verbalized the following symptoms: fatigue, cough, no new symptoms and no worsening symptoms. Due to no new or worsening symptoms encounter was not routed to provider for escalation. Discussed follow-up appointments. If no appointment was previously scheduled, appointment scheduling offered: Yes  [facility] follow up appointment(s): No future appointments. Non-[facility] follow up appointment(s): Pt to schedule VV with PCP. Non-face-to-face services provided:  Obtained and reviewed discharge summary and/or continuity of care documents     Advance Care Planning:   Does patient have an Advance Directive:  reviewed and current. Patient has following risk factors of: no known risk factors. CTN/ACM reviewed discharge instructions, medical action plan and red flags such as increased shortness of breath, increasing fever and signs of decompensation with patient who verbalized understanding. Discussed exposure protocols and quarantine with CDC Guidelines What to do if you are sick with coronavirus disease 2019.  Patient was given an opportunity for questions and concerns. The patient agrees to contact the [facility] exposure line [phone], local [facility] department [place] Department of Health: ([phone]) and PCP office for questions related to their healthcare. CTN/ACM provided contact information for future needs. Reviewed and educated patient on any new and changed medications related to discharge diagnosis     Patient/family/caregiver given information for GetWell Loop and agrees to enroll yes, patient currently enrolled in the [facility] Loop monitoring program. Loop extended.   Patient's preferred e-mail:    Patient's preferred phone number:   Based on Loop alert triggers, patient will be contacted by nurse care manager for worsening symptoms. Pt will be further monitored by COVID Loop Team based on severity of symptoms and risk factors.

## 2021-08-08 ENCOUNTER — APPOINTMENT (OUTPATIENT)
Dept: GENERAL RADIOLOGY | Age: 36
End: 2021-08-08
Payer: MEDICARE

## 2021-08-08 ENCOUNTER — HOSPITAL ENCOUNTER (EMERGENCY)
Age: 36
Discharge: HOME | End: 2021-08-08
Attending: EMERGENCY MEDICINE
Payer: MEDICARE

## 2021-08-08 VITALS
HEART RATE: 72 BPM | OXYGEN SATURATION: 98 % | HEIGHT: 64 IN | TEMPERATURE: 97.9 F | RESPIRATION RATE: 16 BRPM | BODY MASS INDEX: 42.51 KG/M2 | WEIGHT: 249 LBS | DIASTOLIC BLOOD PRESSURE: 92 MMHG | SYSTOLIC BLOOD PRESSURE: 133 MMHG

## 2021-08-08 DIAGNOSIS — R05.9 COUGH: ICD-10-CM

## 2021-08-08 DIAGNOSIS — B33.8 RESPIRATORY SYNCYTIAL VIRUS (RSV): Primary | ICD-10-CM

## 2021-08-08 LAB
RSV BY PCR: NEGATIVE
SARS-COV-2, NAAT: NOT DETECTED

## 2021-08-08 PROCEDURE — 99282 EMERGENCY DEPT VISIT SF MDM: CPT

## 2021-08-08 PROCEDURE — 87635 SARS-COV-2 COVID-19 AMP PRB: CPT

## 2021-08-08 PROCEDURE — 87634 RSV DNA/RNA AMP PROBE: CPT

## 2021-08-08 RX ORDER — BENZONATATE 100 MG/1
100-200 CAPSULE ORAL 3 TIMES DAILY PRN
Qty: 60 CAPSULE | Refills: 0 | Status: SHIPPED | OUTPATIENT
Start: 2021-08-08 | End: 2021-08-18

## 2021-08-08 ASSESSMENT — ENCOUNTER SYMPTOMS
SORE THROAT: 0
BACK PAIN: 0
COUGH: 1
VOMITING: 0
NAUSEA: 0
SHORTNESS OF BREATH: 0
DIARRHEA: 0
ABDOMINAL PAIN: 0

## 2021-08-08 NOTE — ED PROVIDER NOTES
[facility] ED  eMERGENCYdEPARTMENT eNCOUnter      Pt Name: José Epperson  MRN: [id number]  [place] 1985  Date of evaluation: 8/8/2021  Karely Gandhi MD    CHIEF COMPLAINT           HPI  José Epperson is a 43 y.o. female per chart review has a h/o hpl presents to the ED with cough. Pt notes cough x 2-3 days. Pt denies fever, n/v, cp, sob, dysuria, diarrhea. +Sick contact. ROS  Review of Systems   Constitutional: Negative for activity change, chills and fever. HENT: Negative for ear pain and sore throat. Eyes: Negative for visual disturbance. Respiratory: Positive for cough. Negative for shortness of breath. Cardiovascular: Negative for chest pain, palpitations and leg swelling. Gastrointestinal: Negative for abdominal pain, diarrhea, nausea and vomiting. Genitourinary: Negative for dysuria. Musculoskeletal: Negative for back pain. Skin: Negative for rash. Neurological: Negative for dizziness and weakness. Except as noted above the remainder of the review of systems was reviewed and negative. PAST MEDICAL HISTORY     Past Medical History:   Diagnosis Date    Hyperlipidemia     Thyroid disease          SURGICAL HISTORY       Past Surgical History:   Procedure Laterality Date    HYSTERECTOMY           CURRENTMEDICATIONS       Previous Medications    ALBUTEROL SULFATE HFA (VENTOLIN HFA) 108 (90 BASE) MCG/ACT INHALER    Inhale 2 puffs into the lungs 4 times daily as needed for Wheezing    ATORVASTATIN (LIPITOR) 20 MG TABLET    Take 1 tablet by mouth nightly    BUDESONIDE-FORMOTEROL (SYMBICORT) 160-4.5 MCG/ACT AERO    Inhale 2 puffs into the lungs 2 times daily    KETOROLAC (TORADOL) 10 MG TABLET    Take 1 tablet by mouth every 6 hours as needed for Pain    LEVOTHYROXINE (SYNTHROID) 200 MCG TABLET    Take 1/2 tab on Monday. Take 1 full tab all other days of the week.  Patient take 5 days a week    SUMATRIPTAN (IMITREX) 25 MG TABLET    Take 25 mg by mouth as needed       ALLERGIES     Latex, Penicillins, Phenobarbital, and Adhesive tape    FAMILY HISTORY       Family History   Problem Relation Age of Onset    Coronary Art Dis Paternal Grandfather           SOCIAL HISTORY       Social History     Socioeconomic History    Marital status: Single     Spouse name: None    Number of children: None    Years of education: None    Highest education level: None   Occupational History    None   Tobacco Use    Smoking status: Never Smoker    Smokeless tobacco: Never Used   Substance and Sexual Activity    Alcohol use: Never    Drug use: Never    Sexual activity: None   Other Topics Concern    None   Social History Narrative    None     Social Determinants of Health     Financial Resource Strain:     Difficulty of Paying Living Expenses:    Food Insecurity:     Worried About Running Out of Food in the Last Year:     Ran Out of Food in the Last Year:    Transportation Needs:     Lack of Transportation (Medical):  Lack of Transportation (Non-Medical):    Physical Activity:     Days of Exercise per Week:     Minutes of Exercise per Session:    Stress:     Feeling of Stress :    Social Connections:     Frequency of Communication with Friends and Family:     Frequency of Social Gatherings with Friends and Family:     Attends Hoahaoism Services:     Active Member of Clubs or Organizations:     Attends Club or Organization Meetings:     Marital Status:    Intimate Partner Violence:     Fear of Current or Ex-Partner:     Emotionally Abused:     Physically Abused:     Sexually Abused:          PHYSICAL EXAM       ED Triage Vitals [08/08/21 1818]   BP Temp Temp Source Pulse Resp SpO2 Height Weight   (!) 133/92 97.9 °F (36.6 °C) Skin 72 16 98 % 5' 4\" (1.626 m) 249 lb (112.9 kg)       Physical Exam  Vitals and nursing note reviewed. Constitutional:       Appearance: She is well-developed. HENT:      Head: Normocephalic.       Right Ear: External ear normal. Left Ear: External ear normal.   Eyes:      Conjunctiva/sclera: Conjunctivae normal.      Pupils: Pupils are equal, round, and reactive to light. Cardiovascular:      Rate and Rhythm: Normal rate and regular rhythm. Heart sounds: Normal heart sounds. Pulmonary:      Effort: Pulmonary effort is normal.      Breath sounds: Normal breath sounds. Abdominal:      General: Bowel sounds are normal. There is no distension. Palpations: Abdomen is soft. Tenderness: There is no abdominal tenderness. Musculoskeletal:         General: Normal range of motion. Cervical back: Normal range of motion and neck supple. Skin:     General: Skin is warm and dry. Neurological:      Mental Status: She is alert and oriented to person, place, and time. Psychiatric:         Mood and Affect: Mood normal.           MDM  44 yo female presents to the ED with cough. Pt is afebrile, hemodynamically stable. +Sick contact. Pt's daughter who is also present is RSV positive. Covid negative. cXR negative. Pt and mother educated about cough and RSV. Pt given prescription for tessalon perles, given pneumonia warning signs and will f/u with pcp. Mother understands plan. FINAL IMPRESSION      1. Respiratory syncytial virus (RSV)    2.  Cough          DISPOSITION/PLAN   DISPOSITION Decision To Discharge 08/08/2021 06:59:39 PM        DISCHARGE MEDICATIONS:  [unfilled]         Mi Vale MD(electronically signed)  Attending Emergency Physician            Mi Vale MD  08/08/21 5270

## 2022-09-30 NOTE — CONSULTS
Consult Note  Patient: Jayla Ards  Unit/Bed: C003/U107-98  YOB: 1985  MRN: [id number]  Acct: [de-identified]   Admitting Diagnosis: JOSE-56 [U07.1]  Date:  2/1/2021  Hospital Day: 2      Chief Complaint:  Shortness of breath    History of Present Illness: This is a 33-year-old  female with past medical history significant for dyslipidemia and hypothyroidism who presented to the emergency room on 2/1/2021 with complaints of shortness of breath. Prior to presentation, patient states that she had been quarantining at home with her fiancé and her children as she had recently tested positive for Covid on 1/25/2021. She had been experiencing low-grade fevers, myalgias and cough but due to worsening shortness of breath her fiancé made her present to the ER for further evaluation. On presentation to the emergency room, blood pressure 132/79, heart rate 83, respiratory rate 20, she was hypoxic with SPO2 of 88% on room air, temperature 98.6 °F.  Chest x-ray revealed bilateral infiltrates. CTA of the chest showed no CT evidence of pulmonary embolism, bilateral airspace disease consistent with COVID-19. Sodium 135, potassium 3.5, chloride 98, total CO2 26, BUN 9, creatinine 0.75, GFR greater than 60, glucose 111. Pregnancy test negative. WBC 3.9, hemoglobin 11.4, hematocrit 33.9, platelets 387. D-dimer elevated 1.78. She was admitted for further evaluation. At time of evaluation today, patient is seen on 5 W. She is on 8 L high flow O2 and maintaining SPO2 of 98%. EKG from 2/1/2021 at 1923 showed sinus bradycardia at 49 bpm with ST elevations in leads I and aVL and ST depression with T wave inversion in lead III as well as nonspecific ST and T wave changes in V3 through V6. She had serial troponins negative x3 at less than 0.010 on 2/2/2021. He has been initiated on Decadron 6 mg p.o. daily x10 doses and remdesivir 100 mg IV every 24 hours x4 doses.   She is currently on Lovenox 30 mg 02/02/21 2156    ibuprofen (ADVIL;MOTRIN) tablet 400 mg  400 mg Oral Q6H PRN Mike Chapmanas Sedar, DO   400 mg at 02/03/21 6910    remdesivir 100 mg in sodium chloride 0.9 % 250 mL IVPB  100 mg Intravenous Q24H Camillo Adelaas Sedar, DO   Stopped at 02/02/21 1558    albuterol-ipratropium (COMBIVENT RESPIMAT)  MCG/ACT inhaler 1 puff  1 puff Inhalation Q4H PRN Storm Acron, DO           PMHx:  Past Medical History:   Diagnosis Date    Hyperlipidemia     Thyroid disease        PSHx:  No past surgical history on file. Social Hx:  Social History     Socioeconomic History    Marital status: Single     Spouse name: Not on file    Number of children: Not on file    Years of education: Not on file    Highest education level: Not on file   Occupational History    Not on file   Social Needs    Financial resource strain: Not on file    Food insecurity     Worry: Not on file     Inability: Not on file    Transportation needs     Medical: Not on file     Non-medical: Not on file   Tobacco Use    Smoking status: Not on file   Substance and Sexual Activity    Alcohol use: Not on file    Drug use: Not on file    Sexual activity: Not on file   Lifestyle    Physical activity     Days per week: Not on file     Minutes per session: Not on file    Stress: Not on file   Relationships    Social connections     Talks on phone: Not on file     Gets together: Not on file     Attends Episcopal service: Not on file     Active member of club or organization: Not on file     Attends meetings of clubs or organizations: Not on file     Relationship status: Not on file    Intimate partner violence     Fear of current or ex partner: Not on file     Emotionally abused: Not on file     Physically abused: Not on file     Forced sexual activity: Not on file   Other Topics Concern    Not on file   Social History Narrative    Not on file       Family Hx:  No family history on file.     Review of Systems:   Review of Systems   Constitutional: Positive for fatigue. Negative for activity change. HENT: Negative for congestion. Respiratory: Positive for chest tightness and shortness of breath. Cardiovascular: Positive for chest pain. Negative for palpitations and leg swelling. Gastrointestinal: Negative for nausea and vomiting. Genitourinary: Negative for difficulty urinating. Musculoskeletal: Positive for myalgias. Skin: Negative for color change, pallor and rash. Neurological: Negative for dizziness, syncope and light-headedness. Psychiatric/Behavioral: Negative for agitation and behavioral problems. Physical Examination:    /60   Pulse 65   Temp 97.3 °F (36.3 °C) (Oral)   Resp 18   Wt 260 lb 9.3 oz (118.2 kg)   SpO2 98%   BMI 44.73 kg/m²    Physical Exam  Constitutional:       General: She is not in acute distress. Appearance: Normal appearance. She is obese. HENT:      Head: Normocephalic and atraumatic. Neck:      Musculoskeletal: Normal range of motion and neck supple. Cardiovascular:      Rate and Rhythm: Normal rate and regular rhythm. Pulmonary:      Effort: Pulmonary effort is normal. No respiratory distress. Breath sounds: No wheezing, rhonchi or rales. Comments: On 8L high flow O2  Abdominal:      Palpations: Abdomen is soft. Tenderness: There is no abdominal tenderness. Musculoskeletal: Normal range of motion. Right lower leg: No edema. Left lower leg: No edema. Skin:     General: Skin is warm and dry. Neurological:      General: No focal deficit present. Mental Status: She is alert and oriented to person, place, and time. Cranial Nerves: No cranial nerve deficit.    Psychiatric:         Mood and Affect: Mood normal.         Behavior: Behavior normal.         LABS:  CBC:  Lab Results   Component Value Date    WBC 6.3 02/03/2021    RBC 3.84 02/03/2021    HGB 11.8 02/03/2021    HCT 35.5 02/03/2021    MCV 92.4 02/03/2021    MCH 30.7 02/03/2021    MCHC 33.3 medications-Lipitor 20 mg p.o. daily, levothyroxine 200 mcg p.o. daily, Decadron 6 mg p.o. daily, remdesivir 100 mg IV every 24 hours x4 doses started on 2/2/2021, Lovenox 30 mg subcu twice daily  2. Cardiac diet recommended  3. Check limited echocardiogram  4. Check repeat EKG today reevaluate ST changes noted on EKG from 2/1/2021  5. Monitor on telemetry for any tachycardia or bradycardia arrhythmias  6. Maintain potassium greater than 4, magnesium greater than 2  7. Pulmonology recommendations--currently on high flow O2 at 8 L/min, remdesivir IV, dexamethasone x10 days  8. Internal medicine recommendations  9. Coronary evaluation per Dr. Anum Loyd. Serial troponins have been negative x3 on 2/2/2021. Patient does not clinically appear to be in any acute distress at this time. Chest pain is exacerbated with activity and with inspiration and she notices some worsening discomfort when lying flat with relief when sitting forward. Chest pain questionable secondary to Pericarditis versus pleuritic versus other.   10. Further recommendations to follow            Electronically signed by MOE Kiran on 2/3/2021 at 2:36 PM none

## 2022-10-11 ENCOUNTER — HOSPITAL ENCOUNTER (EMERGENCY)
Age: 37
Discharge: HOME | End: 2022-10-11
Payer: MEDICARE

## 2022-10-11 ENCOUNTER — APPOINTMENT (OUTPATIENT)
Dept: CT IMAGING | Age: 37
End: 2022-10-11
Payer: MEDICARE

## 2022-10-11 VITALS
HEART RATE: 60 BPM | DIASTOLIC BLOOD PRESSURE: 68 MMHG | RESPIRATION RATE: 16 BRPM | HEIGHT: 64 IN | WEIGHT: 250 LBS | TEMPERATURE: 98.4 F | OXYGEN SATURATION: 98 % | BODY MASS INDEX: 42.68 KG/M2 | SYSTOLIC BLOOD PRESSURE: 106 MMHG

## 2022-10-11 DIAGNOSIS — R51.9 ACUTE NONINTRACTABLE HEADACHE, UNSPECIFIED HEADACHE TYPE: Primary | ICD-10-CM

## 2022-10-11 LAB
ALBUMIN SERPL-MCNC: 4.2 G/DL (ref 3.5–4.6)
ALP BLD-CCNC: 71 U/L (ref 40–130)
ALT SERPL-CCNC: 7 U/L (ref 0–33)
ANION GAP SERPL CALCULATED.3IONS-SCNC: 11 MEQ/L (ref 9–15)
AST SERPL-CCNC: 13 U/L (ref 0–35)
BASOPHILS ABSOLUTE: 0.1 K/UL (ref 0–0.2)
BASOPHILS RELATIVE PERCENT: 0.6 %
BILIRUB SERPL-MCNC: 0.6 MG/DL (ref 0.2–0.7)
BUN BLDV-MCNC: 9 MG/DL (ref 6–20)
CALCIUM SERPL-MCNC: 9 MG/DL (ref 8.5–9.9)
CHLORIDE BLD-SCNC: 100 MEQ/L (ref 95–107)
CO2: 23 MEQ/L (ref 20–31)
CREAT SERPL-MCNC: 0.73 MG/DL (ref 0.5–0.9)
EOSINOPHILS ABSOLUTE: 0.2 K/UL (ref 0–0.7)
EOSINOPHILS RELATIVE PERCENT: 1.3 %
GFR AFRICAN AMERICAN: >60
GFR NON-AFRICAN AMERICAN: >60
GLOBULIN: 2.6 G/DL (ref 2.3–3.5)
GLUCOSE BLD-MCNC: 104 MG/DL (ref 70–99)
HCT VFR BLD CALC: 38.4 % (ref 37–47)
HEMOGLOBIN: 12.5 G/DL (ref 12–16)
LYMPHOCYTES ABSOLUTE: 1.8 K/UL (ref 1–4.8)
LYMPHOCYTES RELATIVE PERCENT: 12.8 %
MCH RBC QN AUTO: 29.4 PG (ref 27–31.3)
MCHC RBC AUTO-ENTMCNC: 32.5 % (ref 33–37)
MCV RBC AUTO: 90.5 FL (ref 82–100)
MONOCYTES ABSOLUTE: 0.6 K/UL (ref 0.2–0.8)
MONOCYTES RELATIVE PERCENT: 4.5 %
NEUTROPHILS ABSOLUTE: 11.5 K/UL (ref 1.4–6.5)
NEUTROPHILS RELATIVE PERCENT: 80.8 %
PDW BLD-RTO: 13.5 % (ref 11.5–14.5)
PLATELET # BLD: 305 K/UL (ref 130–400)
POTASSIUM SERPL-SCNC: 4.3 MEQ/L (ref 3.4–4.9)
RBC # BLD: 4.25 M/UL (ref 4.2–5.4)
SODIUM BLD-SCNC: 134 MEQ/L (ref 135–144)
TOTAL PROTEIN: 6.8 G/DL (ref 6.3–8)
WBC # BLD: 14.3 K/UL (ref 4.8–10.8)

## 2022-10-11 PROCEDURE — 36415 COLL VENOUS BLD VENIPUNCTURE: CPT

## 2022-10-11 PROCEDURE — 80053 COMPREHEN METABOLIC PANEL: CPT

## 2022-10-11 PROCEDURE — 99284 EMERGENCY DEPT VISIT MOD MDM: CPT

## 2022-10-11 PROCEDURE — 6360000002 HC RX W HCPCS: Performed by: STUDENT IN AN ORGANIZED HEALTH CARE EDUCATION/TRAINING PROGRAM

## 2022-10-11 PROCEDURE — 96374 THER/PROPH/DIAG INJ IV PUSH: CPT

## 2022-10-11 PROCEDURE — 85025 COMPLETE CBC W/AUTO DIFF WBC: CPT

## 2022-10-11 PROCEDURE — 70450 CT HEAD/BRAIN W/O DYE: CPT

## 2022-10-11 PROCEDURE — 96375 TX/PRO/DX INJ NEW DRUG ADDON: CPT

## 2022-10-11 PROCEDURE — 2580000003 HC RX 258: Performed by: STUDENT IN AN ORGANIZED HEALTH CARE EDUCATION/TRAINING PROGRAM

## 2022-10-11 RX ORDER — DIPHENHYDRAMINE HYDROCHLORIDE 50 MG/ML
25 INJECTION INTRAMUSCULAR; INTRAVENOUS ONCE
Status: COMPLETED | OUTPATIENT
Start: 2022-10-11 | End: 2022-10-11

## 2022-10-11 RX ORDER — KETOROLAC TROMETHAMINE 30 MG/ML
30 INJECTION, SOLUTION INTRAMUSCULAR; INTRAVENOUS ONCE
Status: COMPLETED | OUTPATIENT
Start: 2022-10-11 | End: 2022-10-11

## 2022-10-11 RX ORDER — IBUPROFEN 800 MG/1
800 TABLET ORAL 2 TIMES DAILY PRN
Qty: 30 TABLET | Refills: 0 | Status: SHIPPED | OUTPATIENT
Start: 2022-10-11

## 2022-10-11 RX ORDER — 0.9 % SODIUM CHLORIDE 0.9 %
1000 INTRAVENOUS SOLUTION INTRAVENOUS ONCE
Status: COMPLETED | OUTPATIENT
Start: 2022-10-11 | End: 2022-10-11

## 2022-10-11 RX ORDER — METOCLOPRAMIDE HYDROCHLORIDE 5 MG/ML
10 INJECTION INTRAMUSCULAR; INTRAVENOUS ONCE
Status: COMPLETED | OUTPATIENT
Start: 2022-10-11 | End: 2022-10-11

## 2022-10-11 RX ORDER — SUMATRIPTAN 50 MG/1
50 TABLET, FILM COATED ORAL
Qty: 9 TABLET | Refills: 2 | Status: SHIPPED | OUTPATIENT
Start: 2022-10-11 | End: 2022-10-11

## 2022-10-11 RX ADMIN — DIPHENHYDRAMINE HYDROCHLORIDE 25 MG: 50 INJECTION, SOLUTION INTRAMUSCULAR; INTRAVENOUS at 00:49

## 2022-10-11 RX ADMIN — METOCLOPRAMIDE 10 MG: 5 INJECTION, SOLUTION INTRAMUSCULAR; INTRAVENOUS at 00:50

## 2022-10-11 RX ADMIN — KETOROLAC TROMETHAMINE 30 MG: 30 INJECTION, SOLUTION INTRAMUSCULAR; INTRAVENOUS at 00:49

## 2022-10-11 RX ADMIN — SODIUM CHLORIDE 1000 ML: 9 INJECTION, SOLUTION INTRAVENOUS at 00:48

## 2022-10-11 ASSESSMENT — ENCOUNTER SYMPTOMS
DIARRHEA: 0
NAUSEA: 0
SHORTNESS OF BREATH: 0
SORE THROAT: 0
EYE PAIN: 0
CHEST TIGHTNESS: 0
BACK PAIN: 0

## 2022-10-11 ASSESSMENT — PAIN DESCRIPTION - DESCRIPTORS: DESCRIPTORS: POUNDING

## 2022-10-11 ASSESSMENT — PAIN DESCRIPTION - LOCATION: LOCATION: HEAD

## 2022-10-11 ASSESSMENT — PAIN SCALES - GENERAL
PAINLEVEL_OUTOF10: 10
PAINLEVEL_OUTOF10: 10

## 2022-10-11 ASSESSMENT — PAIN - FUNCTIONAL ASSESSMENT
PAIN_FUNCTIONAL_ASSESSMENT: NONE - DENIES PAIN
PAIN_FUNCTIONAL_ASSESSMENT: 0-10

## 2022-10-11 NOTE — Clinical Note
Manuel Perez was seen and treated in our emergency department on 10/11/2022. She may return to work on 10/13/2022. If you have any questions or concerns, please don't hesitate to call.       Kg Sosa PA-C

## 2022-10-11 NOTE — ED NOTES
covering lunch for [employer]. Pt a&ox4, skin w/d/pink, resting in bed, 0 c/o at this time.      Valeriy Martini RN  10/11/22 4879

## 2022-10-11 NOTE — ED TRIAGE NOTES
Pt to room 23 per ems, report received, pt stated throbbing migraine headache starting at 1900, photosensitivity, states no relief with advil taken PTA, denies N/V, triage completed

## 2022-10-11 NOTE — ED NOTES
Dr. Del Ndiaye at bedside to update pt. Pt a&ox4 , skin w/d/pink, 0 c/o, 0 distress. Per  [id number] Kristine Love to d/c pt home.      Sivan Marrero RN  10/11/22 1794

## 2022-10-11 NOTE — ED PROVIDER NOTES
[facility] [facility] ED  eMERGENCYdEPARTMENT eNCOUnter      Pt Name: Henry Vallejo  MRN: [id number]  [place] 1985  Date of evaluation: 10/11/2022  Yong Petersen PA-C    CHIEF COMPLAINT           HPI  Henry Vallejo is a 42 y.o. female brought in by ambulance for a sudden onset, severe, sharp, nondraining pain to her right temple that began 5 hours ago. Patient states she laid down and took ibuprofen without any relief. The pain got so bad they called the ambulance and brought her here to the hospital.  She denies blurry vision, double vision, fever, chills, shortness of breath, chest pain. Patient states she has never had a headache like this before. ROS  Review of Systems   Constitutional:  Negative for chills, fatigue and fever. HENT:  Negative for ear pain, hearing loss and sore throat. Eyes:  Negative for pain and visual disturbance. Respiratory:  Negative for chest tightness and shortness of breath. Cardiovascular:  Negative for chest pain. Gastrointestinal:  Negative for diarrhea and nausea. Endocrine: Negative for cold intolerance. Genitourinary:  Negative for hematuria. Musculoskeletal:  Negative for back pain. Skin:  Negative for rash and wound. Neurological:  Positive for headaches. Negative for dizziness. Psychiatric/Behavioral:  Negative for behavioral problems and confusion. Except as noted above the remainder of the review of systems was reviewed and negative.        PAST MEDICAL HISTORY     Past Medical History:   Diagnosis Date    Hyperlipidemia     Thyroid disease          SURGICAL HISTORY       Past Surgical History:   Procedure Laterality Date    HYSTERECTOMY (CERVIX STATUS UNKNOWN)           CURRENTMEDICATIONS       Previous Medications    ALBUTEROL SULFATE HFA (VENTOLIN HFA) 108 (90 BASE) MCG/ACT INHALER    Inhale 2 puffs into the lungs 4 times daily as needed for Wheezing    ATORVASTATIN (LIPITOR) 20 MG TABLET    Take 1 tablet by mouth nightly    BUDESONIDE-FORMOTEROL (SYMBICORT) 160-4.5 MCG/ACT AERO    Inhale 2 puffs into the lungs 2 times daily    KETOROLAC (TORADOL) 10 MG TABLET    Take 1 tablet by mouth every 6 hours as needed for Pain    LEVOTHYROXINE (SYNTHROID) 200 MCG TABLET    Take 1/2 tab on Monday. Take 1 full tab all other days of the week. Patient take 5 days a week       ALLERGIES     Latex, Penicillins, Phenobarbital, and Adhesive tape    FAMILY HISTORY       Family History   Problem Relation Age of Onset    Coronary Art Dis Paternal Grandfather           SOCIAL HISTORY       Social History     Socioeconomic History    Marital status: Single     Spouse name: None    Number of children: None    Years of education: None    Highest education level: None   Tobacco Use    Smoking status: Never    Smokeless tobacco: Never   Substance and Sexual Activity    Alcohol use: Never    Drug use: Never         PHYSICAL EXAM       ED Triage Vitals   BP Temp Temp src Pulse Resp SpO2 Height Weight   -- -- -- -- -- -- -- --       Physical Exam  Constitutional:       Appearance: Normal appearance. HENT:      Head: Normocephalic and atraumatic. Right Ear: External ear normal.      Left Ear: External ear normal.      Nose: Nose normal.      Mouth/Throat:      Mouth: Mucous membranes are moist.   Eyes:      Extraocular Movements: Extraocular movements intact. Conjunctiva/sclera: Conjunctivae normal.   Cardiovascular:      Rate and Rhythm: Normal rate and regular rhythm. Heart sounds: Normal heart sounds. Pulmonary:      Effort: Pulmonary effort is normal.      Breath sounds: Normal breath sounds. No stridor. No wheezing or rhonchi. Abdominal:      Palpations: Abdomen is soft. Tenderness: no abdominal tenderness   Musculoskeletal:         General: Normal range of motion. Cervical back: Normal range of motion and neck supple. No tenderness. Skin:     General: Skin is warm and dry.    Neurological:      General: No focal deficit present. Mental Status: She is alert and oriented to person, place, and time. Psychiatric:         Mood and Affect: Mood normal.         Behavior: Behavior normal.         MDM  This is a 40-year-old female presenting with a headache. Patient is afebrile and hemodynamically stable. Patient given IV fluids, IV Toradol, IV Reglan, IV Benadryl. Labs unremarkable. CT head negative for acute bleeds. Likely migraine. Patient reevaluated and is sleeping, upon waking states she feels much better. She is agreeable to discharge with symptomatic care and she will follow-up with her primary care doctor and return as needed. Patient given strict return protocols with any worsening symptoms. FINAL IMPRESSION      1.  Acute nonintractable headache, unspecified headache type          DISPOSITION/PLAN   DISPOSITION Discharge - Pending Orders Complete 10/11/2022 01:07:37 AM        DISCHARGE MEDICATIONS:  [unfilled]         Sidney Myers PA-C(electronically signed)  Attending Emergency Physician          Sidney Myers PA-C  10/11/22 0111

## 2023-05-06 ENCOUNTER — APPOINTMENT (OUTPATIENT)
Dept: CT IMAGING | Age: 38
End: 2023-05-06
Payer: MEDICAID

## 2023-05-06 ENCOUNTER — HOSPITAL ENCOUNTER (EMERGENCY)
Age: 38
Discharge: HOME | End: 2023-05-07
Payer: MEDICAID

## 2023-05-06 DIAGNOSIS — N30.91 HEMORRHAGIC CYSTITIS: Primary | ICD-10-CM

## 2023-05-06 LAB
ALBUMIN SERPL-MCNC: 4.1 G/DL (ref 3.5–4.6)
ALP SERPL-CCNC: 76 U/L (ref 40–130)
ALT SERPL-CCNC: 11 U/L (ref 0–33)
ANION GAP SERPL CALCULATED.3IONS-SCNC: 11 MEQ/L (ref 9–15)
AST SERPL-CCNC: 28 U/L (ref 0–35)
BASOPHILS # BLD: 0.1 K/UL (ref 0–0.2)
BASOPHILS NFR BLD: 0.5 %
BILIRUB SERPL-MCNC: 0.5 MG/DL (ref 0.2–0.7)
BUN SERPL-MCNC: 11 MG/DL (ref 6–20)
CALCIUM SERPL-MCNC: 9.4 MG/DL (ref 8.5–9.9)
CHLORIDE SERPL-SCNC: 102 MEQ/L (ref 95–107)
CO2 SERPL-SCNC: 22 MEQ/L (ref 20–31)
CREAT SERPL-MCNC: 0.69 MG/DL (ref 0.5–0.9)
EOSINOPHIL # BLD: 0.1 K/UL (ref 0–0.7)
EOSINOPHIL NFR BLD: 0.4 %
ERYTHROCYTE [DISTWIDTH] IN BLOOD BY AUTOMATED COUNT: 13.4 % (ref 11.5–14.5)
GLOBULIN SER CALC-MCNC: 3.2 G/DL (ref 2.3–3.5)
GLUCOSE SERPL-MCNC: 92 MG/DL (ref 70–99)
HCT VFR BLD AUTO: 41.5 % (ref 37–47)
HGB BLD-MCNC: 13.6 G/DL (ref 12–16)
LACTATE BLDV-SCNC: 0.9 MMOL/L (ref 0.5–2.2)
LYMPHOCYTES # BLD: 2 K/UL (ref 1–4.8)
LYMPHOCYTES NFR BLD: 12.1 %
MCH RBC QN AUTO: 30 PG (ref 27–31.3)
MCHC RBC AUTO-ENTMCNC: 32.9 % (ref 33–37)
MCV RBC AUTO: 91.1 FL (ref 79.4–94.8)
MONOCYTES # BLD: 0.8 K/UL (ref 0.2–0.8)
MONOCYTES NFR BLD: 4.8 %
NEUTROPHILS # BLD: 13.8 K/UL (ref 1.4–6.5)
NEUTS SEG NFR BLD: 82.2 %
PLATELET # BLD AUTO: 343 K/UL (ref 130–400)
POTASSIUM SERPL-SCNC: 5.2 MEQ/L (ref 3.4–4.9)
PROT SERPL-MCNC: 7.3 G/DL (ref 6.3–8)
RBC # BLD AUTO: 4.55 M/UL (ref 4.2–5.4)
SODIUM SERPL-SCNC: 135 MEQ/L (ref 135–144)
WBC # BLD AUTO: 16.8 K/UL (ref 4.8–10.8)

## 2023-05-06 PROCEDURE — 2580000003 HC RX 258: Performed by: PHYSICIAN ASSISTANT

## 2023-05-06 PROCEDURE — 6360000002 HC RX W HCPCS: Performed by: PHYSICIAN ASSISTANT

## 2023-05-06 PROCEDURE — 96365 THER/PROPH/DIAG IV INF INIT: CPT

## 2023-05-06 PROCEDURE — 81001 URINALYSIS AUTO W/SCOPE: CPT

## 2023-05-06 PROCEDURE — 96375 TX/PRO/DX INJ NEW DRUG ADDON: CPT

## 2023-05-06 PROCEDURE — 74150 CT ABDOMEN W/O CONTRAST: CPT

## 2023-05-06 PROCEDURE — 83605 ASSAY OF LACTIC ACID: CPT

## 2023-05-06 PROCEDURE — 36415 COLL VENOUS BLD VENIPUNCTURE: CPT

## 2023-05-06 PROCEDURE — 85025 COMPLETE CBC W/AUTO DIFF WBC: CPT

## 2023-05-06 PROCEDURE — 6370000000 HC RX 637 (ALT 250 FOR IP): Performed by: PHYSICIAN ASSISTANT

## 2023-05-06 PROCEDURE — 80053 COMPREHEN METABOLIC PANEL: CPT

## 2023-05-06 PROCEDURE — 99284 EMERGENCY DEPT VISIT MOD MDM: CPT

## 2023-05-06 RX ORDER — PHENAZOPYRIDINE HYDROCHLORIDE 200 MG/1
200 TABLET, FILM COATED ORAL ONCE
Status: COMPLETED | OUTPATIENT
Start: 2023-05-06 | End: 2023-05-06

## 2023-05-06 RX ORDER — KETOROLAC TROMETHAMINE 30 MG/ML
30 INJECTION, SOLUTION INTRAMUSCULAR; INTRAVENOUS ONCE
Status: COMPLETED | OUTPATIENT
Start: 2023-05-06 | End: 2023-05-06

## 2023-05-06 RX ORDER — 0.9 % SODIUM CHLORIDE 0.9 %
1000 INTRAVENOUS SOLUTION INTRAVENOUS ONCE
Status: COMPLETED | OUTPATIENT
Start: 2023-05-06 | End: 2023-05-07

## 2023-05-06 RX ORDER — ONDANSETRON 2 MG/ML
4 INJECTION INTRAMUSCULAR; INTRAVENOUS ONCE
Status: COMPLETED | OUTPATIENT
Start: 2023-05-06 | End: 2023-05-06

## 2023-05-06 RX ADMIN — PHENAZOPYRIDINE HYDROCHLORIDE 200 MG: 200 TABLET ORAL at 22:54

## 2023-05-06 RX ADMIN — KETOROLAC TROMETHAMINE 30 MG: 30 INJECTION, SOLUTION INTRAMUSCULAR; INTRAVENOUS at 22:42

## 2023-05-06 RX ADMIN — ONDANSETRON 4 MG: 2 INJECTION INTRAMUSCULAR; INTRAVENOUS at 22:44

## 2023-05-06 RX ADMIN — SODIUM CHLORIDE 1000 ML: 9 INJECTION, SOLUTION INTRAVENOUS at 22:44

## 2023-05-06 ASSESSMENT — PAIN DESCRIPTION - PAIN TYPE: TYPE: ACUTE PAIN

## 2023-05-06 ASSESSMENT — PAIN DESCRIPTION - LOCATION: LOCATION: ABDOMEN

## 2023-05-06 ASSESSMENT — ENCOUNTER SYMPTOMS
APNEA: 0
ALLERGIC/IMMUNOLOGIC NEGATIVE: 1
TROUBLE SWALLOWING: 0
SHORTNESS OF BREATH: 0
ABDOMINAL PAIN: 1
EYE PAIN: 0
NAUSEA: 1
COLOR CHANGE: 0

## 2023-05-06 ASSESSMENT — PAIN DESCRIPTION - ORIENTATION: ORIENTATION: LOWER

## 2023-05-06 ASSESSMENT — PAIN SCALES - GENERAL
PAINLEVEL_OUTOF10: 10
PAINLEVEL_OUTOF10: 10
PAINLEVEL_OUTOF10: 8
PAINLEVEL_OUTOF10: 10

## 2023-05-06 ASSESSMENT — PAIN DESCRIPTION - DESCRIPTORS: DESCRIPTORS: PRESSURE

## 2023-05-06 ASSESSMENT — PAIN - FUNCTIONAL ASSESSMENT: PAIN_FUNCTIONAL_ASSESSMENT: 0-10

## 2023-05-07 VITALS
RESPIRATION RATE: 18 BRPM | HEIGHT: 64 IN | SYSTOLIC BLOOD PRESSURE: 109 MMHG | HEART RATE: 75 BPM | TEMPERATURE: 98 F | BODY MASS INDEX: 43.54 KG/M2 | DIASTOLIC BLOOD PRESSURE: 68 MMHG | OXYGEN SATURATION: 98 % | WEIGHT: 255 LBS

## 2023-05-07 LAB
BACTERIA URNS QL MICRO: ABNORMAL /HPF
BILIRUB UR QL STRIP: ABNORMAL
CLARITY UR: ABNORMAL
COLOR UR: ABNORMAL
EPI CELLS #/AREA URNS AUTO: ABNORMAL /HPF (ref 0–5)
EPI CELLS #/AREA URNS HPF: ABNORMAL /HPF
GLUCOSE UR STRIP-MCNC: NEGATIVE MG/DL
HGB UR QL STRIP: ABNORMAL
HYALINE CASTS #/AREA URNS AUTO: ABNORMAL /HPF (ref 0–5)
KETONES UR STRIP-MCNC: 40 MG/DL
LEUKOCYTE ESTERASE UR QL STRIP: ABNORMAL
NITRITE UR QL STRIP: POSITIVE
PH UR STRIP: 7.5 [PH] (ref 5–9)
PROT UR STRIP-MCNC: >=300 MG/DL
RBC #/AREA URNS HPF: ABNORMAL /HPF (ref 0–2)
REASON FOR REJECTION: NORMAL
REJECTED TEST: NORMAL
SP GR UR STRIP: 1.02 (ref 1–1.03)
URINE REFLEX TO CULTURE: YES
UROBILINOGEN UR STRIP-ACNC: 0.2 E.U./DL
WBC #/AREA URNS AUTO: >100 /HPF (ref 0–5)
WBC #/AREA URNS HPF: >100 /HPF (ref 0–5)
YEAST URNS QL MICRO: PRESENT /HPF

## 2023-05-07 PROCEDURE — 2580000003 HC RX 258: Performed by: PHYSICIAN ASSISTANT

## 2023-05-07 PROCEDURE — 6360000002 HC RX W HCPCS: Performed by: PHYSICIAN ASSISTANT

## 2023-05-07 RX ORDER — ETODOLAC 400 MG/1
400 TABLET, FILM COATED ORAL 2 TIMES DAILY
Qty: 14 TABLET | Refills: 0 | Status: SHIPPED | OUTPATIENT
Start: 2023-05-07

## 2023-05-07 RX ORDER — PHENAZOPYRIDINE HYDROCHLORIDE 200 MG/1
200 TABLET, FILM COATED ORAL 3 TIMES DAILY PRN
Qty: 6 TABLET | Refills: 0 | Status: SHIPPED | OUTPATIENT
Start: 2023-05-07 | End: 2023-05-10

## 2023-05-07 RX ORDER — CIPROFLOXACIN 500 MG/1
500 TABLET, FILM COATED ORAL 2 TIMES DAILY
Qty: 14 TABLET | Refills: 0 | Status: SHIPPED | OUTPATIENT
Start: 2023-05-07 | End: 2023-05-14

## 2023-05-07 RX ORDER — CEFDINIR 300 MG/1
300 CAPSULE ORAL 2 TIMES DAILY
Qty: 20 CAPSULE | Refills: 0 | Status: SHIPPED | OUTPATIENT
Start: 2023-05-07 | End: 2023-05-17

## 2023-05-07 RX ORDER — ONDANSETRON 4 MG/1
4 TABLET, ORALLY DISINTEGRATING ORAL 3 TIMES DAILY PRN
Qty: 21 TABLET | Refills: 0 | Status: SHIPPED | OUTPATIENT
Start: 2023-05-07

## 2023-05-07 RX ADMIN — CEFTRIAXONE SODIUM 1000 MG: 1 INJECTION, POWDER, FOR SOLUTION INTRAMUSCULAR; INTRAVENOUS at 00:36

## 2023-09-18 ENCOUNTER — HOSPITAL ENCOUNTER (EMERGENCY)
Age: 38
Discharge: HOME | End: 2023-09-18
Payer: MEDICAID

## 2023-09-18 VITALS
HEART RATE: 68 BPM | TEMPERATURE: 97.5 F | DIASTOLIC BLOOD PRESSURE: 81 MMHG | HEIGHT: 64 IN | WEIGHT: 255 LBS | RESPIRATION RATE: 16 BRPM | SYSTOLIC BLOOD PRESSURE: 140 MMHG | BODY MASS INDEX: 43.54 KG/M2 | OXYGEN SATURATION: 98 %

## 2023-09-18 DIAGNOSIS — J06.9 VIRAL URI WITH COUGH: Primary | ICD-10-CM

## 2023-09-18 DIAGNOSIS — Z20.822 CLOSE EXPOSURE TO COVID-19 VIRUS: ICD-10-CM

## 2023-09-18 LAB
INFLUENZA A BY PCR: NEGATIVE
INFLUENZA B BY PCR: NEGATIVE
SARS-COV-2 RDRP RESP QL NAA+PROBE: NOT DETECTED

## 2023-09-18 PROCEDURE — 6370000000 HC RX 637 (ALT 250 FOR IP)

## 2023-09-18 PROCEDURE — 87635 SARS-COV-2 COVID-19 AMP PRB: CPT

## 2023-09-18 PROCEDURE — 87502 INFLUENZA DNA AMP PROBE: CPT

## 2023-09-18 PROCEDURE — 99283 EMERGENCY DEPT VISIT LOW MDM: CPT

## 2023-09-18 RX ORDER — IBUPROFEN 600 MG/1
600 TABLET ORAL ONCE
Status: COMPLETED | OUTPATIENT
Start: 2023-09-18 | End: 2023-09-18

## 2023-09-18 RX ORDER — ECHINACEA PURPUREA EXTRACT 125 MG
1 TABLET ORAL PRN
Qty: 1 EACH | Refills: 0 | Status: SHIPPED | OUTPATIENT
Start: 2023-09-18

## 2023-09-18 RX ORDER — IBUPROFEN 600 MG/1
600 TABLET ORAL EVERY 8 HOURS PRN
Qty: 20 TABLET | Refills: 0 | Status: SHIPPED | OUTPATIENT
Start: 2023-09-18

## 2023-09-18 RX ADMIN — IBUPROFEN 600 MG: 600 TABLET ORAL at 20:05

## 2023-09-18 ASSESSMENT — ENCOUNTER SYMPTOMS
BACK PAIN: 0
COUGH: 1
SHORTNESS OF BREATH: 0
NAUSEA: 0
SORE THROAT: 0
DIARRHEA: 0
ABDOMINAL PAIN: 0
VOMITING: 0

## 2023-09-18 ASSESSMENT — PAIN - FUNCTIONAL ASSESSMENT: PAIN_FUNCTIONAL_ASSESSMENT: 0-10

## 2023-09-18 ASSESSMENT — PAIN SCALES - GENERAL: PAINLEVEL_OUTOF10: 7

## 2023-09-18 ASSESSMENT — PAIN DESCRIPTION - LOCATION: LOCATION: HEAD

## 2023-09-18 ASSESSMENT — LIFESTYLE VARIABLES
HOW MANY STANDARD DRINKS CONTAINING ALCOHOL DO YOU HAVE ON A TYPICAL DAY: PATIENT DOES NOT DRINK
HOW OFTEN DO YOU HAVE A DRINK CONTAINING ALCOHOL: NEVER

## 2023-09-18 NOTE — ED PROVIDER NOTES
[facility]  eMERGENCYdEPARTMENT eNCOUnter      Pt Name: Weston Larkin  MRN: [id number]  Birthdate 1985of evaluation: 9/18/2023  [facility]    CHIEF COMPLAINT       Chief Complaint   Patient presents with    Headache     Cough, headache, congestion. HISTORY OF PRESENT ILLNESS  (Location/Symptom, Timing/Onset, Context/Setting, Quality, Duration, Modifying Factors, Severity.)   Eva Contreras is a 34 y.o. female  hx of hyperlipidemia, thyroid disease, who presents to the emergency department with headache, cough, congestion, concern for COVID. Patient presents to the emergency department with complaints of cough nasal congestion headache body aches that started yesterday. She was in the emergency department earlier today and her stepson who tested positive for COVID she is concerned she may have COVID. She has a 7/10 dull gradual persistent headache. She denies any visual changes. She has not taken anything for her symptoms. She denies any fever, chills, shortness of breath, chest pain, nausea, vomiting, diarrhea, or recent illness. HPI    Nursing Notes were reviewed and I agree. REVIEW OF SYSTEMS    (2-9 systems for level 4, 10 or more for level 5)     Review of Systems   Constitutional:  Negative for activity change, chills and fever. HENT:  Positive for congestion. Negative for ear pain and sore throat. Eyes:  Negative for visual disturbance. Respiratory:  Positive for cough. Negative for shortness of breath. Cardiovascular:  Negative for chest pain, palpitations and leg swelling. Gastrointestinal:  Negative for abdominal pain, diarrhea, nausea and vomiting. Genitourinary:  Negative for dysuria. Musculoskeletal:  Negative for back pain. Skin:  Negative for rash. Neurological:  Positive for headaches. Negative for dizziness and weakness. as noted above the remainder of the review of systems was reviewed and negative.        PAST

## 2023-09-19 NOTE — DISCHARGE INSTRUCTIONS
Please take Tylenol/Motrin PRN for pain/fever. Follow up with PCP. Return to ED for any new or worsening symptoms.

## 2024-03-07 ENCOUNTER — HOSPITAL ENCOUNTER (EMERGENCY)
Age: 39
Discharge: HOME | End: 2024-03-07
Attending: EMERGENCY MEDICINE
Payer: MEDICAID

## 2024-03-07 VITALS
DIASTOLIC BLOOD PRESSURE: 89 MMHG | SYSTOLIC BLOOD PRESSURE: 126 MMHG | BODY MASS INDEX: 46.1 KG/M2 | TEMPERATURE: 98.4 F | WEIGHT: 270 LBS | HEART RATE: 76 BPM | RESPIRATION RATE: 16 BRPM | OXYGEN SATURATION: 96 % | HEIGHT: 64 IN

## 2024-03-07 DIAGNOSIS — R11.2 NAUSEA AND VOMITING, UNSPECIFIED VOMITING TYPE: Primary | ICD-10-CM

## 2024-03-07 DIAGNOSIS — R19.7 DIARRHEA, UNSPECIFIED TYPE: ICD-10-CM

## 2024-03-07 PROCEDURE — 6370000000 HC RX 637 (ALT 250 FOR IP): Performed by: EMERGENCY MEDICINE

## 2024-03-07 PROCEDURE — 96372 THER/PROPH/DIAG INJ SC/IM: CPT

## 2024-03-07 PROCEDURE — 99284 EMERGENCY DEPT VISIT MOD MDM: CPT

## 2024-03-07 PROCEDURE — 6360000002 HC RX W HCPCS: Performed by: EMERGENCY MEDICINE

## 2024-03-07 RX ORDER — ONDANSETRON 4 MG/1
4 TABLET, ORALLY DISINTEGRATING ORAL ONCE
Status: COMPLETED | OUTPATIENT
Start: 2024-03-07 | End: 2024-03-07

## 2024-03-07 RX ORDER — DICYCLOMINE HYDROCHLORIDE 10 MG/ML
20 INJECTION INTRAMUSCULAR ONCE
Status: COMPLETED | OUTPATIENT
Start: 2024-03-07 | End: 2024-03-07

## 2024-03-07 RX ADMIN — DICYCLOMINE HYDROCHLORIDE 20 MG: 10 INJECTION, SOLUTION INTRAMUSCULAR at 09:57

## 2024-03-07 RX ADMIN — ONDANSETRON 4 MG: 4 TABLET, ORALLY DISINTEGRATING ORAL at 09:57

## 2024-03-07 ASSESSMENT — ENCOUNTER SYMPTOMS
VOMITING: 1
DIARRHEA: 1
NAUSEA: 1

## 2024-03-07 ASSESSMENT — PAIN - FUNCTIONAL ASSESSMENT: PAIN_FUNCTIONAL_ASSESSMENT: NONE - DENIES PAIN

## 2024-03-07 NOTE — ED PROVIDER NOTES
10:03 AM [facility] ED  EMERGENCY DEPARTMENT ENCOUNTER      Pt Name: Eva Gregory  MRN: [id number]  Birthdate 1985  Date of evaluation: 3/7/2024  Provider: Dalton Buitrago MD    CHIEF COMPLAINT       Chief Complaint   Patient presents with    Abdominal Pain     Diarrhea since Tuesday.          HISTORY OF PRESENT ILLNESS   (Location/Symptom, Timing/Onset, Context/Setting, Quality, Duration, Modifying Factors, Severity)  Note limiting factors.   38-year-old female presenting with nausea, vomiting and diarrhea.  Symptoms have been ongoing for couple of days.  Has taken over-the-counter medication without improvement.  Daughter sick with similar symptoms a few days prior.  Denies abdominal pain or fevers.        Nursing Notes were reviewed.    REVIEW OF SYSTEMS    (2-9 systems for level 4, 10 or more for level 5)     Review of Systems   Gastrointestinal:  Positive for diarrhea, nausea and vomiting.   All other systems reviewed and are negative.      Except as noted above the remainder of the review of systems was reviewed and negative.       PAST MEDICAL HISTORY     Past Medical History:   Diagnosis Date    Hyperlipidemia     Thyroid disease          SURGICAL HISTORY       Past Surgical History:   Procedure Laterality Date    HYSTERECTOMY (CERVIX STATUS UNKNOWN)           CURRENT MEDICATIONS       Current Discharge Medication List        CONTINUE these medications which have NOT CHANGED    Details   ibuprofen (IBU) 600 MG tablet Take 1 tablet by mouth every 8 hours as needed for Pain or Fever  Qty: 20 tablet, Refills: 0      sodium chloride (OCEAN) 0.65 % nasal spray 1 spray by Nasal route as needed for Congestion  Qty: 1 each, Refills: 0      albuterol sulfate HFA (VENTOLIN HFA) 108 (90 Base) MCG/ACT inhaler Inhale 2 puffs into the lungs 4 times daily as needed for Wheezing or Shortness of Breath  Qty: 18 g, Refills: 0      etodolac (LODINE) 400 MG tablet Take 1 tablet by mouth 2 times daily  Qty: 14

## 2024-03-07 NOTE — ED NOTES
Pt is given d/c instructions, no scripts and work excuse.  Pt voiced understanding of d/c instructions without further questions.

## 2024-03-07 NOTE — DISCHARGE INSTRUCTIONS
SOFT DIET AND ADVANCE AS TOLERATED.  DRINK PLENTY OF FLUIDS.  RETURN FOR NEW OR WORSENING SYMPTOMS.

## 2024-03-07 NOTE — ED TRIAGE NOTES
Pt a/o x 3 skin pink w/d resp non labored. Pt  c/o sour feeling in stomach and diarrhea since Tuesday. Pt in nad. Per pt daughter had same sx on Sunday.Daughter was not seen.

## 2024-04-01 ENCOUNTER — HOSPITAL ENCOUNTER (EMERGENCY)
Age: 39
Discharge: HOME | End: 2024-04-01
Attending: EMERGENCY MEDICINE
Payer: MEDICAID

## 2024-04-01 VITALS
TEMPERATURE: 99.1 F | HEART RATE: 70 BPM | OXYGEN SATURATION: 94 % | SYSTOLIC BLOOD PRESSURE: 113 MMHG | WEIGHT: 271 LBS | HEIGHT: 64 IN | RESPIRATION RATE: 20 BRPM | BODY MASS INDEX: 46.26 KG/M2 | DIASTOLIC BLOOD PRESSURE: 66 MMHG

## 2024-04-01 DIAGNOSIS — J02.9 ACUTE PHARYNGITIS, UNSPECIFIED ETIOLOGY: Primary | ICD-10-CM

## 2024-04-01 DIAGNOSIS — Z20.818 STREP THROAT EXPOSURE: ICD-10-CM

## 2024-04-01 LAB — STREP GRP A PCR: NEGATIVE

## 2024-04-01 PROCEDURE — 6370000000 HC RX 637 (ALT 250 FOR IP)

## 2024-04-01 PROCEDURE — 87651 STREP A DNA AMP PROBE: CPT

## 2024-04-01 PROCEDURE — 99283 EMERGENCY DEPT VISIT LOW MDM: CPT

## 2024-04-01 RX ORDER — IBUPROFEN 600 MG/1
600 TABLET ORAL EVERY 8 HOURS PRN
Qty: 20 TABLET | Refills: 0 | Status: SHIPPED | OUTPATIENT
Start: 2024-04-01

## 2024-04-01 RX ORDER — IBUPROFEN 600 MG/1
600 TABLET ORAL ONCE
Status: COMPLETED | OUTPATIENT
Start: 2024-04-01 | End: 2024-04-01

## 2024-04-01 RX ORDER — AZITHROMYCIN 250 MG/1
500 TABLET, FILM COATED ORAL ONCE
Status: COMPLETED | OUTPATIENT
Start: 2024-04-01 | End: 2024-04-01

## 2024-04-01 RX ORDER — AZITHROMYCIN 250 MG/1
500 TABLET, FILM COATED ORAL DAILY
Qty: 8 TABLET | Refills: 0 | Status: SHIPPED | OUTPATIENT
Start: 2024-04-01 | End: 2024-04-05

## 2024-04-01 RX ADMIN — AZITHROMYCIN DIHYDRATE 500 MG: 250 TABLET ORAL at 21:00

## 2024-04-01 RX ADMIN — IBUPROFEN 600 MG: 600 TABLET, FILM COATED ORAL at 21:00

## 2024-04-01 ASSESSMENT — ENCOUNTER SYMPTOMS
SORE THROAT: 1
DIARRHEA: 0
BACK PAIN: 0
COUGH: 0
SHORTNESS OF BREATH: 0
NAUSEA: 0
ABDOMINAL PAIN: 0
VOMITING: 0

## 2024-04-01 ASSESSMENT — PAIN DESCRIPTION - DESCRIPTORS: DESCRIPTORS: SHARP

## 2024-04-01 ASSESSMENT — PAIN DESCRIPTION - LOCATION: LOCATION: THROAT

## 2024-04-01 ASSESSMENT — PAIN - FUNCTIONAL ASSESSMENT: PAIN_FUNCTIONAL_ASSESSMENT: 0-10

## 2024-04-01 ASSESSMENT — PAIN SCALES - GENERAL: PAINLEVEL_OUTOF10: 6

## 2024-04-01 ASSESSMENT — PAIN DESCRIPTION - ORIENTATION: ORIENTATION: POSTERIOR

## 2024-04-01 ASSESSMENT — PAIN DESCRIPTION - PAIN TYPE: TYPE: ACUTE PAIN

## 2024-04-02 NOTE — DISCHARGE INSTRUCTIONS
Please finish complete course of antibiotic.  Please take Tylenol or Motrin as needed for pain/fever control.  Follow-up with your primary care doctor.  Return to emergency department for any new or worsening symptoms.

## 2024-06-12 NOTE — ED PROVIDER NOTES
122.9 kg (271 lb)       Physical Exam  Vitals and nursing note reviewed.   Constitutional:       General: She is not in acute distress.     Appearance: She is well-developed. She is not ill-appearing, toxic-appearing or diaphoretic.   HENT:      Head: Normocephalic and atraumatic.      Right Ear: External ear normal.      Left Ear: External ear normal.      Nose: No congestion.      Mouth/Throat:      Mouth: Mucous membranes are moist.      Pharynx: Uvula midline. Posterior oropharyngeal erythema present. No pharyngeal swelling or uvula swelling.      Tonsils: No tonsillar exudate. 1+ on the right. 1+ on the left.   Eyes:      Conjunctiva/sclera: Conjunctivae normal.      Pupils: Pupils are equal, round, and reactive to light.   Cardiovascular:      Rate and Rhythm: Normal rate and regular rhythm.      Heart sounds: Normal heart sounds. No murmur heard.     No friction rub.   Pulmonary:      Effort: Pulmonary effort is normal.      Breath sounds: Normal breath sounds. No wheezing or rhonchi.   Abdominal:      General: Bowel sounds are normal. There is no distension.      Palpations: Abdomen is soft.      Tenderness: There is no abdominal tenderness.   Musculoskeletal:         General: Normal range of motion.      Cervical back: Normal range of motion and neck supple.   Lymphadenopathy:      Cervical: Cervical adenopathy present.   Skin:     General: Skin is warm and dry.      Capillary Refill: Capillary refill takes less than 2 seconds.      Findings: No rash.   Neurological:      General: No focal deficit present.      Mental Status: She is alert and oriented to person, place, and time.   Psychiatric:         Mood and Affect: Mood normal.         Behavior: Behavior normal.           DIAGNOSTIC RESULTS     RADIOLOGY:   Non-plain film images such as CT, Ultrasound and MRI are read by the radiologist. Plain radiographic images are visualized and preliminarilyinterpreted by ARLYN Victoria - CARMEN with the below  Plan: Referral to vein center faxed over today Detail Level: Zone